# Patient Record
Sex: MALE | Race: OTHER | NOT HISPANIC OR LATINO | URBAN - METROPOLITAN AREA
[De-identification: names, ages, dates, MRNs, and addresses within clinical notes are randomized per-mention and may not be internally consistent; named-entity substitution may affect disease eponyms.]

---

## 2017-01-25 ENCOUNTER — EMERGENCY (EMERGENCY)
Facility: HOSPITAL | Age: 52
LOS: 1 days | Discharge: HOME | End: 2017-01-25
Attending: EMERGENCY MEDICINE | Admitting: EMERGENCY MEDICINE
Payer: COMMERCIAL

## 2017-01-25 VITALS
OXYGEN SATURATION: 97 % | SYSTOLIC BLOOD PRESSURE: 135 MMHG | RESPIRATION RATE: 19 BRPM | WEIGHT: 190.04 LBS | TEMPERATURE: 99 F | HEIGHT: 67 IN | HEART RATE: 77 BPM | DIASTOLIC BLOOD PRESSURE: 90 MMHG

## 2017-01-25 VITALS
TEMPERATURE: 98 F | SYSTOLIC BLOOD PRESSURE: 130 MMHG | HEART RATE: 72 BPM | RESPIRATION RATE: 16 BRPM | DIASTOLIC BLOOD PRESSURE: 87 MMHG | OXYGEN SATURATION: 100 %

## 2017-01-25 DIAGNOSIS — R63.0 ANOREXIA: ICD-10-CM

## 2017-01-25 DIAGNOSIS — R10.31 RIGHT LOWER QUADRANT PAIN: ICD-10-CM

## 2017-01-25 PROBLEM — Z00.00 ENCOUNTER FOR PREVENTIVE HEALTH EXAMINATION: Status: ACTIVE | Noted: 2017-01-25

## 2017-01-25 LAB
ALBUMIN SERPL ELPH-MCNC: 4.5 G/DL — SIGNIFICANT CHANGE UP (ref 3.3–5)
ALP SERPL-CCNC: 52 U/L — SIGNIFICANT CHANGE UP (ref 40–120)
ALT FLD-CCNC: 32 U/L RC — SIGNIFICANT CHANGE UP (ref 10–45)
ANION GAP SERPL CALC-SCNC: 15 MMOL/L — SIGNIFICANT CHANGE UP (ref 5–17)
APPEARANCE UR: CLEAR — SIGNIFICANT CHANGE UP
AST SERPL-CCNC: 24 U/L — SIGNIFICANT CHANGE UP (ref 10–40)
BASOPHILS # BLD AUTO: 0 K/UL — SIGNIFICANT CHANGE UP (ref 0–0.2)
BASOPHILS NFR BLD AUTO: 0.7 % — SIGNIFICANT CHANGE UP (ref 0–2)
BILIRUB SERPL-MCNC: 0.6 MG/DL — SIGNIFICANT CHANGE UP (ref 0.2–1.2)
BILIRUB UR-MCNC: NEGATIVE — SIGNIFICANT CHANGE UP
BUN SERPL-MCNC: 13 MG/DL — SIGNIFICANT CHANGE UP (ref 7–23)
CALCIUM SERPL-MCNC: 9.3 MG/DL — SIGNIFICANT CHANGE UP (ref 8.4–10.5)
CHLORIDE SERPL-SCNC: 102 MMOL/L — SIGNIFICANT CHANGE UP (ref 96–108)
CO2 SERPL-SCNC: 25 MMOL/L — SIGNIFICANT CHANGE UP (ref 22–31)
COLOR SPEC: SIGNIFICANT CHANGE UP
CREAT SERPL-MCNC: 0.84 MG/DL — SIGNIFICANT CHANGE UP (ref 0.5–1.3)
DIFF PNL FLD: NEGATIVE — SIGNIFICANT CHANGE UP
EOSINOPHIL # BLD AUTO: 0.1 K/UL — SIGNIFICANT CHANGE UP (ref 0–0.5)
EOSINOPHIL NFR BLD AUTO: 2.1 % — SIGNIFICANT CHANGE UP (ref 0–6)
GLUCOSE SERPL-MCNC: 99 MG/DL — SIGNIFICANT CHANGE UP (ref 70–99)
GLUCOSE UR QL: NEGATIVE — SIGNIFICANT CHANGE UP
HCT VFR BLD CALC: 40.7 % — SIGNIFICANT CHANGE UP (ref 39–50)
HGB BLD-MCNC: 14.2 G/DL — SIGNIFICANT CHANGE UP (ref 13–17)
KETONES UR-MCNC: NEGATIVE — SIGNIFICANT CHANGE UP
LEUKOCYTE ESTERASE UR-ACNC: NEGATIVE — SIGNIFICANT CHANGE UP
LYMPHOCYTES # BLD AUTO: 2.3 K/UL — SIGNIFICANT CHANGE UP (ref 1–3.3)
LYMPHOCYTES # BLD AUTO: 42.6 % — SIGNIFICANT CHANGE UP (ref 13–44)
MCHC RBC-ENTMCNC: 32.7 PG — SIGNIFICANT CHANGE UP (ref 27–34)
MCHC RBC-ENTMCNC: 34.8 GM/DL — SIGNIFICANT CHANGE UP (ref 32–36)
MCV RBC AUTO: 94 FL — SIGNIFICANT CHANGE UP (ref 80–100)
MONOCYTES # BLD AUTO: 0.4 K/UL — SIGNIFICANT CHANGE UP (ref 0–0.9)
MONOCYTES NFR BLD AUTO: 7.9 % — SIGNIFICANT CHANGE UP (ref 2–14)
NEUTROPHILS # BLD AUTO: 2.6 K/UL — SIGNIFICANT CHANGE UP (ref 1.8–7.4)
NEUTROPHILS NFR BLD AUTO: 46.8 % — SIGNIFICANT CHANGE UP (ref 43–77)
NITRITE UR-MCNC: NEGATIVE — SIGNIFICANT CHANGE UP
PH UR: 6.5 — SIGNIFICANT CHANGE UP (ref 4.8–8)
PLATELET # BLD AUTO: 208 K/UL — SIGNIFICANT CHANGE UP (ref 150–400)
POTASSIUM SERPL-MCNC: 3.6 MMOL/L — SIGNIFICANT CHANGE UP (ref 3.5–5.3)
POTASSIUM SERPL-SCNC: 3.6 MMOL/L — SIGNIFICANT CHANGE UP (ref 3.5–5.3)
PROT SERPL-MCNC: 7.1 G/DL — SIGNIFICANT CHANGE UP (ref 6–8.3)
PROT UR-MCNC: NEGATIVE — SIGNIFICANT CHANGE UP
RBC # BLD: 4.33 M/UL — SIGNIFICANT CHANGE UP (ref 4.2–5.8)
RBC # FLD: 13.2 % — SIGNIFICANT CHANGE UP (ref 10.3–14.5)
SODIUM SERPL-SCNC: 142 MMOL/L — SIGNIFICANT CHANGE UP (ref 135–145)
SP GR SPEC: 1.01 — SIGNIFICANT CHANGE UP (ref 1.01–1.02)
UROBILINOGEN FLD QL: NEGATIVE — SIGNIFICANT CHANGE UP
WBC # BLD: 5.5 K/UL — SIGNIFICANT CHANGE UP (ref 3.8–10.5)
WBC # FLD AUTO: 5.5 K/UL — SIGNIFICANT CHANGE UP (ref 3.8–10.5)

## 2017-01-25 PROCEDURE — 81003 URINALYSIS AUTO W/O SCOPE: CPT

## 2017-01-25 PROCEDURE — 99284 EMERGENCY DEPT VISIT MOD MDM: CPT | Mod: 25

## 2017-01-25 PROCEDURE — 74176 CT ABD & PELVIS W/O CONTRAST: CPT

## 2017-01-25 PROCEDURE — 99285 EMERGENCY DEPT VISIT HI MDM: CPT

## 2017-01-25 PROCEDURE — 74176 CT ABD & PELVIS W/O CONTRAST: CPT | Mod: 26

## 2017-01-25 PROCEDURE — 80053 COMPREHEN METABOLIC PANEL: CPT

## 2017-01-25 PROCEDURE — 85027 COMPLETE CBC AUTOMATED: CPT

## 2017-01-25 RX ORDER — SODIUM CHLORIDE 9 MG/ML
1000 INJECTION INTRAMUSCULAR; INTRAVENOUS; SUBCUTANEOUS ONCE
Qty: 0 | Refills: 0 | Status: COMPLETED | OUTPATIENT
Start: 2017-01-25 | End: 2017-01-25

## 2017-01-25 RX ORDER — SODIUM CHLORIDE 9 MG/ML
3 INJECTION INTRAMUSCULAR; INTRAVENOUS; SUBCUTANEOUS ONCE
Qty: 0 | Refills: 0 | Status: COMPLETED | OUTPATIENT
Start: 2017-01-25 | End: 2017-01-25

## 2017-01-25 RX ORDER — ACETAMINOPHEN 500 MG
975 TABLET ORAL ONCE
Qty: 0 | Refills: 0 | Status: COMPLETED | OUTPATIENT
Start: 2017-01-25 | End: 2017-01-25

## 2017-01-25 RX ADMIN — Medication 975 MILLIGRAM(S): at 19:21

## 2017-01-25 RX ADMIN — Medication 975 MILLIGRAM(S): at 17:55

## 2017-01-25 RX ADMIN — SODIUM CHLORIDE 3 MILLILITER(S): 9 INJECTION INTRAMUSCULAR; INTRAVENOUS; SUBCUTANEOUS at 17:55

## 2017-01-25 RX ADMIN — SODIUM CHLORIDE 1000 MILLILITER(S): 9 INJECTION INTRAMUSCULAR; INTRAVENOUS; SUBCUTANEOUS at 17:55

## 2017-01-25 NOTE — ED PROVIDER NOTE - MEDICAL DECISION MAKING DETAILS
Pt with RLQ abd pain. Diff dx includes appendicitis, R sided diverticulitis, ureteral stone, MSK pain, UTI/pyelo.   Some concerning sx for appy such as anorexia, location of pain, but exam not very impressive.     Will get labs, UA, give pain meds, IVF, urinalysis, obtain CT A/P+. If negative, cause of pain likely not life-threatening.

## 2017-01-25 NOTE — ED PROVIDER NOTE - PROGRESS NOTE DETAILS
Pt made aware of incidental questionable bladder lesion and need to follow up as well as punctuate nephrolithiasis.

## 2017-01-25 NOTE — ED PROVIDER NOTE - RADIOLOGY FINDINGS
no appy. Pt informed of incidental findings such as bladder lesion., will f/u with PMD./reviewed by me

## 2017-01-25 NOTE — ED PROVIDER NOTE - OBJECTIVE STATEMENT
51yoM with no sig pmhx presents with 1 day RLQ abd pain, constant, achy. Went to PCP who sent him to ED to r/o appy. Denies vomiting or fever. +anorexia. States had BRB in toilet bowl 3 days ago but not since. Otherwise normal BMs. Pain aggravated with walking. Has not taken pain meds. Had dysuria last week and PCP checked urine, states was normal. Now no longer with dysuria. No testicular pain.

## 2017-01-25 NOTE — ED ADULT NURSE REASSESSMENT NOTE - NS ED NURSE REASSESS COMMENT FT1
Pt AAOx4, NAD, sitting up comfortably in bed with family at bedside. Pt reports improvement in RLQ pain to 6/10. Pt denies headache, dizziness, chest pain, cough, SOB, n/v/d, urinary symptoms, fevers, chills, weakness at this time. Safety maintained. Pt awaiting CT results.

## 2017-01-25 NOTE — ED ADULT NURSE NOTE - OBJECTIVE STATEMENT
52yo m a&ox4 ambulated into ED from pmd office to r/o appy. pt states RLQ pain began yesterday, denies fevers/chills, denies n/v/d, denies pain upon urination, +tender to RLQ upon palpation. pt denies recent abd sx.

## 2017-04-06 ENCOUNTER — APPOINTMENT (OUTPATIENT)
Dept: GASTROENTEROLOGY | Facility: CLINIC | Age: 52
End: 2017-04-06

## 2021-09-15 NOTE — ED ADULT TRIAGE NOTE - PRO INTERPRETER NEED 2
[Abdomen Masses] : No abdominal masses [Abdomen Tenderness] : ~T No ~M abdominal tenderness [JVD] : no jugular venous distention  [Respiratory Effort] : normal respiratory effort [Normal Rate and Rhythm] : normal rate and rhythm [No Rash or Lesion] : No rash or lesion [Alert] : alert [Calm] : calm [de-identified] : Umbilical hernia present, reducible, approximately 1 cm defect. Mild diastasis present as well, no other ventral hernia defects appreciated.  [de-identified] : No acute distress English

## 2024-09-25 ENCOUNTER — INPATIENT (INPATIENT)
Facility: HOSPITAL | Age: 59
LOS: 6 days | Discharge: HOME | DRG: 66 | End: 2024-10-02
Attending: NEUROLOGICAL SURGERY | Admitting: NEUROLOGICAL SURGERY
Payer: COMMERCIAL

## 2024-09-25 ENCOUNTER — EMERGENCY (EMERGENCY)
Facility: HOSPITAL | Age: 59
LOS: 1 days | Discharge: ANOTHER HOSPITAL | End: 2024-09-25
Attending: STUDENT IN AN ORGANIZED HEALTH CARE EDUCATION/TRAINING PROGRAM | Admitting: STUDENT IN AN ORGANIZED HEALTH CARE EDUCATION/TRAINING PROGRAM
Payer: COMMERCIAL

## 2024-09-25 VITALS
TEMPERATURE: 98 F | RESPIRATION RATE: 17 BRPM | HEART RATE: 73 BPM | SYSTOLIC BLOOD PRESSURE: 135 MMHG | DIASTOLIC BLOOD PRESSURE: 79 MMHG | OXYGEN SATURATION: 98 %

## 2024-09-25 VITALS
TEMPERATURE: 98 F | SYSTOLIC BLOOD PRESSURE: 136 MMHG | RESPIRATION RATE: 16 BRPM | DIASTOLIC BLOOD PRESSURE: 83 MMHG | OXYGEN SATURATION: 96 % | WEIGHT: 160.06 LBS | HEIGHT: 68 IN | HEART RATE: 72 BPM

## 2024-09-25 VITALS
SYSTOLIC BLOOD PRESSURE: 132 MMHG | HEIGHT: 68 IN | OXYGEN SATURATION: 99 % | WEIGHT: 199.96 LBS | RESPIRATION RATE: 14 BRPM | DIASTOLIC BLOOD PRESSURE: 81 MMHG | HEART RATE: 62 BPM | TEMPERATURE: 98 F

## 2024-09-25 DIAGNOSIS — I62.00 NONTRAUMATIC SUBDURAL HEMORRHAGE, UNSPECIFIED: ICD-10-CM

## 2024-09-25 LAB
ALBUMIN SERPL ELPH-MCNC: 4.2 G/DL — SIGNIFICANT CHANGE UP (ref 3.3–5)
ALP SERPL-CCNC: 56 U/L — SIGNIFICANT CHANGE UP (ref 30–120)
ALT FLD-CCNC: 30 U/L — SIGNIFICANT CHANGE UP (ref 10–60)
ANION GAP SERPL CALC-SCNC: 8 MMOL/L — SIGNIFICANT CHANGE UP (ref 5–17)
AST SERPL-CCNC: 18 U/L — SIGNIFICANT CHANGE UP (ref 10–40)
BASOPHILS # BLD AUTO: 0.01 K/UL — SIGNIFICANT CHANGE UP (ref 0–0.2)
BASOPHILS NFR BLD AUTO: 0.1 % — SIGNIFICANT CHANGE UP (ref 0–2)
BILIRUB SERPL-MCNC: 0.9 MG/DL — SIGNIFICANT CHANGE UP (ref 0.2–1.2)
BLD GP AB SCN SERPL QL: SIGNIFICANT CHANGE UP
BUN SERPL-MCNC: 15 MG/DL — SIGNIFICANT CHANGE UP (ref 7–23)
CALCIUM SERPL-MCNC: 9.6 MG/DL — SIGNIFICANT CHANGE UP (ref 8.4–10.5)
CHLORIDE SERPL-SCNC: 100 MMOL/L — SIGNIFICANT CHANGE UP (ref 96–108)
CO2 SERPL-SCNC: 28 MMOL/L — SIGNIFICANT CHANGE UP (ref 22–31)
CREAT SERPL-MCNC: 0.96 MG/DL — SIGNIFICANT CHANGE UP (ref 0.5–1.3)
EGFR: 90 ML/MIN/1.73M2 — SIGNIFICANT CHANGE UP
EGFR: 90 ML/MIN/1.73M2 — SIGNIFICANT CHANGE UP
EOSINOPHIL # BLD AUTO: 0.02 K/UL — SIGNIFICANT CHANGE UP (ref 0–0.5)
EOSINOPHIL NFR BLD AUTO: 0.3 % — SIGNIFICANT CHANGE UP (ref 0–6)
GLUCOSE BLDC GLUCOMTR-MCNC: 106 MG/DL — HIGH (ref 70–99)
GLUCOSE SERPL-MCNC: 130 MG/DL — HIGH (ref 70–99)
HCT VFR BLD CALC: 41.8 % — SIGNIFICANT CHANGE UP (ref 39–50)
HGB BLD-MCNC: 13.9 G/DL — SIGNIFICANT CHANGE UP (ref 13–17)
IMM GRANULOCYTES NFR BLD AUTO: 0.3 % — SIGNIFICANT CHANGE UP (ref 0–0.9)
LYMPHOCYTES # BLD AUTO: 1.07 K/UL — SIGNIFICANT CHANGE UP (ref 1–3.3)
LYMPHOCYTES # BLD AUTO: 15.6 % — SIGNIFICANT CHANGE UP (ref 13–44)
MAGNESIUM SERPL-MCNC: 1.9 MG/DL — SIGNIFICANT CHANGE UP (ref 1.6–2.6)
MCHC RBC-ENTMCNC: 30.4 PG — SIGNIFICANT CHANGE UP (ref 27–34)
MCHC RBC-ENTMCNC: 33.3 G/DL — SIGNIFICANT CHANGE UP (ref 32–36)
MCV RBC AUTO: 91.5 FL — SIGNIFICANT CHANGE UP (ref 80–100)
MONOCYTES # BLD AUTO: 0.28 K/UL — SIGNIFICANT CHANGE UP (ref 0–0.9)
MONOCYTES NFR BLD AUTO: 4.1 % — SIGNIFICANT CHANGE UP (ref 2–14)
MRSA PCR RESULT.: SIGNIFICANT CHANGE UP
NEUTROPHILS # BLD AUTO: 5.47 K/UL — SIGNIFICANT CHANGE UP (ref 1.8–7.4)
NEUTROPHILS NFR BLD AUTO: 79.6 % — HIGH (ref 43–77)
NRBC # BLD: 0 /100 WBCS — SIGNIFICANT CHANGE UP (ref 0–0)
NRBC BLD-RTO: 0 /100 WBCS — SIGNIFICANT CHANGE UP (ref 0–0)
PLATELET # BLD AUTO: 188 K/UL — SIGNIFICANT CHANGE UP (ref 150–400)
PLATELET RESPONSE ASPIRIN RESULT: 447 ARU — SIGNIFICANT CHANGE UP
POTASSIUM SERPL-MCNC: 3.3 MMOL/L — LOW (ref 3.5–5.3)
POTASSIUM SERPL-SCNC: 3.3 MMOL/L — LOW (ref 3.5–5.3)
PROT SERPL-MCNC: 7.5 G/DL — SIGNIFICANT CHANGE UP (ref 6–8.3)
RBC # BLD: 4.57 M/UL — SIGNIFICANT CHANGE UP (ref 4.2–5.8)
RBC # FLD: 13.6 % — SIGNIFICANT CHANGE UP (ref 10.3–14.5)
S AUREUS DNA NOSE QL NAA+PROBE: SIGNIFICANT CHANGE UP
SODIUM SERPL-SCNC: 136 MMOL/L — SIGNIFICANT CHANGE UP (ref 135–145)
WBC # BLD: 6.87 K/UL — SIGNIFICANT CHANGE UP (ref 3.8–10.5)
WBC # FLD AUTO: 6.87 K/UL — SIGNIFICANT CHANGE UP (ref 3.8–10.5)

## 2024-09-25 PROCEDURE — 99285 EMERGENCY DEPT VISIT HI MDM: CPT | Mod: 25

## 2024-09-25 PROCEDURE — 85025 COMPLETE CBC W/AUTO DIFF WBC: CPT

## 2024-09-25 PROCEDURE — 36415 COLL VENOUS BLD VENIPUNCTURE: CPT

## 2024-09-25 PROCEDURE — 70496 CT ANGIOGRAPHY HEAD: CPT | Mod: 26,MC

## 2024-09-25 PROCEDURE — 70553 MRI BRAIN STEM W/O & W/DYE: CPT | Mod: 26

## 2024-09-25 PROCEDURE — 99291 CRITICAL CARE FIRST HOUR: CPT

## 2024-09-25 PROCEDURE — 96374 THER/PROPH/DIAG INJ IV PUSH: CPT | Mod: XU

## 2024-09-25 PROCEDURE — 83735 ASSAY OF MAGNESIUM: CPT

## 2024-09-25 PROCEDURE — 99285 EMERGENCY DEPT VISIT HI MDM: CPT

## 2024-09-25 PROCEDURE — 96375 TX/PRO/DX INJ NEW DRUG ADDON: CPT | Mod: XU

## 2024-09-25 PROCEDURE — 72125 CT NECK SPINE W/O DYE: CPT | Mod: MC

## 2024-09-25 PROCEDURE — 72125 CT NECK SPINE W/O DYE: CPT | Mod: 26,MC

## 2024-09-25 PROCEDURE — 99223 1ST HOSP IP/OBS HIGH 75: CPT

## 2024-09-25 PROCEDURE — 70498 CT ANGIOGRAPHY NECK: CPT | Mod: 26,MC

## 2024-09-25 PROCEDURE — 70450 CT HEAD/BRAIN W/O DYE: CPT | Mod: MC

## 2024-09-25 PROCEDURE — 80053 COMPREHEN METABOLIC PANEL: CPT

## 2024-09-25 PROCEDURE — 70496 CT ANGIOGRAPHY HEAD: CPT | Mod: MC

## 2024-09-25 PROCEDURE — 70498 CT ANGIOGRAPHY NECK: CPT | Mod: MC

## 2024-09-25 RX ORDER — ACETAMINOPHEN 325 MG
1000 TABLET ORAL ONCE
Refills: 0 | Status: COMPLETED | OUTPATIENT
Start: 2024-09-25 | End: 2024-09-25

## 2024-09-25 RX ORDER — DIPHENHYDRAMINE HCL 12.5MG/5ML
25 ELIXIR ORAL ONCE
Refills: 0 | Status: COMPLETED | OUTPATIENT
Start: 2024-09-25 | End: 2024-09-25

## 2024-09-25 RX ORDER — SENNOSIDES 8.6 MG
2 TABLET ORAL AT BEDTIME
Refills: 0 | Status: DISCONTINUED | OUTPATIENT
Start: 2024-09-25 | End: 2024-10-02

## 2024-09-25 RX ORDER — ONDANSETRON HCL/PF 4 MG/2 ML
4 VIAL (ML) INJECTION EVERY 6 HOURS
Refills: 0 | Status: DISCONTINUED | OUTPATIENT
Start: 2024-09-25 | End: 2024-10-02

## 2024-09-25 RX ORDER — LEVETIRACETAM 1000 MG
500 TABLET ORAL EVERY 12 HOURS
Refills: 0 | Status: DISCONTINUED | OUTPATIENT
Start: 2024-09-25 | End: 2024-09-26

## 2024-09-25 RX ORDER — SODIUM CHLORIDE 0.9 % (FLUSH) 0.9 %
1000 SYRINGE (ML) INJECTION
Refills: 0 | Status: DISCONTINUED | OUTPATIENT
Start: 2024-09-25 | End: 2024-09-27

## 2024-09-25 RX ORDER — INFLUENZA VIRUS VACCINE 15; 15; 15; 15 UG/.5ML; UG/.5ML; UG/.5ML; UG/.5ML
0.5 SUSPENSION INTRAMUSCULAR ONCE
Refills: 0 | Status: DISCONTINUED | OUTPATIENT
Start: 2024-09-25 | End: 2024-10-02

## 2024-09-25 RX ORDER — METOCLOPRAMIDE HCL 10 MG
10 TABLET ORAL ONCE
Refills: 0 | Status: COMPLETED | OUTPATIENT
Start: 2024-09-25 | End: 2024-09-25

## 2024-09-25 RX ORDER — ACETAMINOPHEN 325 MG
650 TABLET ORAL ONCE
Refills: 0 | Status: COMPLETED | OUTPATIENT
Start: 2024-09-25 | End: 2024-09-25

## 2024-09-25 RX ORDER — ACETAMINOPHEN 500 MG/5ML
1000 LIQUID (ML) ORAL ONCE
Refills: 0 | Status: COMPLETED | OUTPATIENT
Start: 2024-09-25 | End: 2024-09-25

## 2024-09-25 RX ORDER — CHLORHEXIDINE GLUCONATE ORAL RINSE 1.2 MG/ML
1 SOLUTION DENTAL DAILY
Refills: 0 | Status: DISCONTINUED | OUTPATIENT
Start: 2024-09-25 | End: 2024-10-02

## 2024-09-25 RX ADMIN — Medication 400 MILLIGRAM(S): at 23:12

## 2024-09-25 RX ADMIN — Medication 400 MILLIGRAM(S): at 17:05

## 2024-09-25 RX ADMIN — Medication 25 MILLIGRAM(S): at 13:43

## 2024-09-25 RX ADMIN — Medication 75 MILLILITER(S): at 17:04

## 2024-09-25 RX ADMIN — Medication 650 MILLIGRAM(S): at 21:58

## 2024-09-25 RX ADMIN — Medication 650 MILLIGRAM(S): at 20:58

## 2024-09-25 RX ADMIN — Medication 10 MILLIGRAM(S): at 13:43

## 2024-09-25 RX ADMIN — Medication 500 MILLIGRAM(S): at 18:12

## 2024-09-25 RX ADMIN — Medication 2 TABLET(S): at 20:28

## 2024-09-25 RX ADMIN — Medication 400 MILLIGRAM(S): at 13:43

## 2024-09-25 RX ADMIN — Medication 1000 MILLILITER(S): at 13:43

## 2024-09-25 NOTE — H&P ADULT - NSHPLABSRESULTS_GEN_ALL_CORE
13.9   6.87  )-----------( 188      ( 25 Sep 2024 13:42 )             41.8     09-25    136  |  100  |  15  ----------------------------<  130[H]  3.3[L]   |  28  |  0.96    Ca    9.6      25 Sep 2024 13:42  Mg     1.9     09-25    TPro  7.5  /  Alb  4.2  /  TBili  0.9  /  DBili  x   /  AST  18  /  ALT  30  /  AlkPhos  56  09-25      < from: CT Head No Cont (09.25.24 @ 14:28) >    IMPRESSION:  1. Bilateral holohemispheric subdural hematomas which are multiloculated   and of mixed density, suggesting both chronic, subacute, and acute   components. Both collections measure approximately 1 cm without   significant midline shift. Gyral and sulcal effacement.  2. No intraparenchymal hemorrhage, or infarct.  3. Widely patent head and neck vasculature, as outlined above.  4. No acute cervical fracture identified. Underlying degenerative changes   most extensive at C5-C6. Upper thoracic degenerative changes also noted.    Follow-up MR imaging of the brain recommended for further assessment and   characterization of the bilateral subdural collections.    Physician:  ISMAEL MARIE was notified of the initial findings on the   preliminary interpretation at 2:28 PM.    --- End of Report ---  TOY CAREY MD; Attending Radiologist  This document has been electronically signed. Sep 25 2024  2:40PM    < end of copied text >

## 2024-09-25 NOTE — CONSULT NOTE ADULT - ASSESSMENT
59M with PMH kidney stones, possible TIA on aspirin 81 mg daily who presented with HA x5-6 days, worsening today with N/V. CTH showed b/l SDH. Transferred to [facility] for neurosurgical evaluation.    Plan:  Neuro:  - Q1 hour Neuro checks, Q1 hour Vitals  - HOB 30 degrees, Neck midline position  - Maintain normothermia, PO acetaminophen for temp>38 C or pain  - Neurosurgical Imaging Reviewed  - Repeat imaging: CTH in 6 hours to establish stability  - MRI w/wo 2/2 non-traumatic SDH  - Continue AED: keppra 500 BID   - Pain management & Sedation: PRN tylenol   - Turn and Position Q2  /  Activity ad ronda, with assistance  	  CV:  - SBP Goal   - BP regimen: PRN hydralazine / labetalol     Pulm:  - Supplemental O2 PRN to maintain Spo2>92%  - Chest PT, OOB, Pulmonary Toilet    GI:  - Nutrition: regular diet, NPO @ Mn   - Bowel regimen: senna, miralax   	  Gu:  - Voiding  - Fluids: NS @ 75   - I&O Q1 hour  - Monitor Electrolytes & Renal Function    Heme:  - Monitor H&H  - ARU now  - Hold AC/Ap  - Chemical DVT prophylaxis: * Chemical DVT prophylaxis is contraindicated due to risk of bleeding  - Mechanical DVT Prophylaxis: Maintain B/L LE sequential compression devices  	  ID:  - Monitor WBC and Temperature  		  Endo  - Monitor BGL, maintain <180

## 2024-09-25 NOTE — H&P ADULT - NSHPPHYSICALEXAM_GEN_ALL_CORE
Constitutional: NAD, lying in bed   Neuro  	* Mental Status:  GCS 15: E4, V5, M6. Awake, alert, oriented to conversation. No aphasia or dysarthria. Able to name objects & their function.   	* Cranial Nerves: Cnii-Cnxii grossly intact. PERRL, EOMI, tongue midline, no gaze deviation.   	* Motor: RUE 5/5, LUE 5/5, RLE 5/5, LLE 5/5, no drift or dysmetria  	* Sensory: Sensation intact to light touch  	* Reflexes: Not assessed  	* Gait: Not assessed  Cardiovascular:   Regular rate and rhythm.  Eyes: See neurologic examination with detailed examination of eyes.  ENT: No JVD, Trachea Midline.  Respiratory: non labored breathing.  Gastrointestinal: Soft, nontender, nondistended.  Genitourinary: Aleman [  ]  No Aleman [ x ]   Musculoskeletal: No muscle wasting noted, (See neurologic assessment for full muscle strength assessment)   Skin:  no skin breakdown  Hematologic / Lymph / Immunologic: No bleeding from IV sites or wounds

## 2024-09-25 NOTE — H&P ADULT - NSHPREVIEWOFSYSTEMS_GEN_ALL_CORE
Constitutional: no fevers, chills, or new weakness  Eyes: No double vision, no change in visual acuity, no blurry vision  Neurologic: + HA. No dizziness, weakness, numbness, tingling, difficulty walking, difficulty speaking   Cardiovascular: No palpitations, no chest pain, no nocturnal or positional dyspnea.  Respiratory: No shortness of breath, No Cough  Gastrointestinal: No change in bowel habits, no change in appetite  Genitourinary: No Frequency, No Dysuria, no Hematuria  Musculoskeletal: No muscle wasting, No new weakness  Psych: No changes in mood, Denies drug use, Denies history of psyciatric illness    All other systems reviewed and are negative

## 2024-09-25 NOTE — PROGRESS NOTE ADULT - ASSESSMENT
Assessment:  58 yo M with BL mixed density SDH with mass effect/brain compression.  PMH of TIA, on ASA.    Plan:  frequent neurochecks  stability CTH (or MRI w/wo if done tonight)  cont heber Combs ppx  plan for MMA embo in am  -160, PRN meds  Maintain osats>92%, incentive spirometry  diet as tolerated - NPO MN for am procedure; BM regimen  monitor e-lytes, I/Os  maintain -180, ISS  SCDs, hold antithrombotics as fresh postop

## 2024-09-25 NOTE — ED PROVIDER NOTE - OBJECTIVE STATEMENT
59 year old male with no significant PMH presents as a transfer from [facility] for SDH. Pt reports that he has had 59 year old male with no significant PMH presents as a transfer from [facility] for SDH. Pt reports that he has had 5 days of a constant, b/l frontal throbbing headache. He denies confusion, blurry vision, extremity weakness. Pt went to [place] where he was found to have b/l SDH and transferred here for NSx.

## 2024-09-25 NOTE — H&P ADULT - NS ATTEND AMEND GEN_ALL_CORE FT
NSGY Attg:    see above    patient seen and examined    agree with exam as above    GCS 15  A and O x 3  5/5  no drift    imaging reviewed -- bilateral subacute SDH    plan of care determined for bilateral SDH  CTA negative  MRI pending  repeat CT 6 hours  hold ASA; check ASA response test  possible bilateral MMA embo per neuro IR

## 2024-09-25 NOTE — H&P ADULT - NSVTERISKREFERASSESS_GEN_ALL_CORE
Refer to the Assessment tab to view/cancel completed assessment.
Render In Strict Bullet Format?: No
Detail Level: Simple
Continue Regimen: Jublia 10 % topical solution with applicator\\nApply to toenails once daily

## 2024-09-25 NOTE — CONSULT NOTE ADULT - SUBJECTIVE AND OBJECTIVE BOX
Patient is a 59y old  Male who presents with a chief complaint of b/l SDH (25 Sep 2024 16:53)    HPI:  59M with PMH kidney stones, possible TIA who presents today for b/l SDH. Per patient, he began having headaches about 5-6 days ago, came on gradually and now 8/10. He was taking intermittent tylenol and ibuprofen for pain which did help for some of the pain. He reports that the headache worsened today and was associated with one episode of nausea and vomiting. Due to symptoms, he presented to [facility] for workup. He underwent CTH which showed b/l chronic on subacute on acute SDH, 1cm each with no MLS. He was transferred to [facility] for neurosurgical evaluation. He admits to taking baby aspirin daily due to possible TIA, last took this am. He denies weakness, numbness, tingling, dizziness, difficulty walking.  (25 Sep 2024 16:53)      PAST MEDICAL & SURGICAL HISTORY:  Brain TIA  Kidney stones  No significant past surgical history      SOCIAL HISTORY:  Tobacco Use: former smoker quit 20 years ago  EtOH use: occasional 1-2 drinks on weekend  Substance: denies       Allergies  No Known Allergies      REVIEW OF SYSTEMS  Negative except as noted in HPI  CONSTITUTIONAL: No fever, weight loss, or fatigue  EYES: No eye pain, visual disturbances, or discharge  ENMT:  No difficulty hearing, tinnitus, vertigo; No sinus or throat pain  NECK: No pain or stiffness  RESPIRATORY: No cough, wheezing, chills or hemoptysis; No shortness of breath  CARDIOVASCULAR: No chest pain, palpitations, dizziness, or leg swelling  GASTROINTESTINAL: No abdominal or epigastric pain. No nausea, vomiting, or hematemesis; No diarrhea or constipation. No melena or hematochezia.  GENITOURINARY: No dysuria, frequency, hematuria, or incontinence  NEUROLOGICAL: No headaches, memory loss, loss of strength, numbness, or tremors  MUSCULOSKELETAL: No joint pain or swelling; No muscle, back, or extremity pain      HOME MEDICATIONS:  Home Medications:  aspirin 81 mg oral capsule: 1 cap(s) orally once a day (25 Sep 2024 16:58)      MEDICATIONS:  Antibiotics:    Neuro:  acetaminophen     Tablet .. 650 milliGRAM(s) Oral once PRN  levETIRAcetam   Injectable 500 milliGRAM(s) IV Push every 12 hours  ondansetron Injectable 4 milliGRAM(s) IV Push every 6 hours PRN    Anticoagulation:    OTHER:  polyethylene glycol 3350 17 Gram(s) Oral daily  senna 2 Tablet(s) Oral at bedtime    IVF:  sodium chloride 0.9%. 1000 milliLiter(s) IV Continuous <Continuous>      Vital Signs Last 24 Hrs  T(C): 36.8 (25 Sep 2024 15:49), Max: 36.8 (25 Sep 2024 15:15)  T(F): 98.3 (25 Sep 2024 15:49), Max: 98.3 (25 Sep 2024 15:15)  HR: 72 (25 Sep 2024 15:49) (62 - 73)  BP: 136/83 (25 Sep 2024 15:49) (132/81 - 136/83)  BP(mean): --  RR: 16 (25 Sep 2024 15:49) (14 - 17)  SpO2: 96% (25 Sep 2024 15:49) (96% - 99%)    Parameters below as of 25 Sep 2024 15:49  Patient On (Oxygen Delivery Method): room air      Physical Exam:  Constitutional: NAD, lying in bed  Neuro  * Mental Status:  GCS 15: Awake, alert, oriented to conversation. No aphasia or difficulty speaking. No dysarthria.  * Cranial Nerves: Cnii-Cnxii grossly intact. PERRL, EOMI, tongue midline, no gaze deviation  * Motor: RUE 5/5, LUE 5/5, RLE 5/5, LLE 5/5, no drift or dysmetria  * Sensory: Sensation intact to light touch  * Reflexes: not assessed   Cardiovascular: Regular rate and rhythm.  Eyes: See neurologic examination with detailed examination of eyes.  ENT: No JVD, Trachea Midline  Respiratory: non labored breathing   Gastrointestinal: Soft, nontender, nondistended.  Genitourinary: [ ] Aleman, [ x ] No Aleman.   Musculoskeletal: No muscle wasting noted, (See neurologic assessment for full muscle strength assessment)   Skin:  no wounds, no redness, no abrasions noted  Hematologic / Lymph / Immunologic: No bleeding from IV sites or wounds      LABS:                        13.9   6.87  )-----------( 188      ( 25 Sep 2024 13:42 )             41.8     09-25    136  |  100  |  15  ----------------------------<  130[H]  3.3[L]   |  28  |  0.96    Ca    9.6      25 Sep 2024 13:42  Mg     1.9     09-25    TPro  7.5  /  Alb  4.2  /  TBili  0.9  /  DBili  x   /  AST  18  /  ALT  30  /  AlkPhos  56  09-25      RADIOLOGY & ADDITIONAL STUDIES:  < from: CT Head No Cont (09.25.24 @ 14:28) >  IMPRESSION:    1. Bilateral holohemispheric subdural hematomas which are multiloculated   and of mixed density, suggesting both chronic, subacute, and acute   components. Both collections measure approximately 1 cm without   significant midline shift. Gyral and sulcal effacement.  2. No intraparenchymal hemorrhage, or infarct.  3. Widely patent head and neck vasculature, as outlined above.  4. No acute cervical fracture identified. Underlying degenerative changes   most extensive at C5-C6. Upper thoracic degenerative changes also noted.    Follow-up MR imaging of the brain recommended for further assessment and   characterization of the bilateral subdural collections.    Physician:  ISMAEL MARIE was notified of the initial findings on the   preliminary interpretation at 2:28 PM.    --- End of Report ---  TOY CAREY MD; Attending Radiologist  This document has been electronically signed. Sep 25 2024  2:40PM    < end of copied text >

## 2024-09-25 NOTE — PROGRESS NOTE ADULT - SUBJECTIVE AND OBJECTIVE BOX
NIGHTTIME ROUNDS    Recent events: admitted to NSICU.    Available labs, vitals, imaging reviewed.    Exam:  AO x3, FC, EO spont, face symmetric, tongue midline, EOMI, PERRLA, motor - JENKINS  Sensation intact to LT.  CTAB  S1S2 present  Abd soft, NT, ND  No peripheral edema   done

## 2024-09-25 NOTE — ED ADULT NURSE NOTE - NSFALLUNIVINTERV_ED_ALL_ED
Bed/Stretcher in lowest position, wheels locked, appropriate side rails in place/Call bell, personal items and telephone in reach/Instruct patient to call for assistance before getting out of bed/chair/stretcher/Non-slip footwear applied when patient is off stretcher/[place] to call system/Physically safe environment - no spills, clutter or unnecessary equipment/Purposeful proactive rounding/Room/bathroom lighting operational, light cord in reach

## 2024-09-25 NOTE — ED ADULT NURSE NOTE - OBJECTIVE STATEMENT
Pt presents to the ED A&Ox4 transferred from [place] for NSG consult. Pt c/o headache. Pt denies N/V at this time. Pt denies vision changes. Pt RR even and unlabored, NAD noted. Pt denies chest/abdominal pain and SOB.

## 2024-09-25 NOTE — H&P ADULT - HISTORY OF PRESENT ILLNESS
59M with PMH kidney stones, possible TIA who presents today for b/l SDH. Per patient, he began having headaches about 5-6 days ago, came on gradually and now 8/10. He was taking intermittent tylenol and ibuprofen for pain which did help for some of the pain. He reports that the headache worsened today and was associated with one episode of nausea and vomiting. Due to symptoms, he presented to [facility] for workup. He underwent CTH which showed b/l chronic on subacute on acute SDH, 1cm each with no MLS. He was transferred to [facility] for neurosurgical evaluation. He admits to taking baby aspirin daily due to possible TIA, last took this am. He denies weakness, numbness, tingling, dizziness, difficulty walking.

## 2024-09-25 NOTE — CONSULT NOTE ADULT - SUBJECTIVE AND OBJECTIVE BOX
HPI:      SUBJECTIVE: No events overnight.  No new neurologic complaints.      acetaminophen     Tablet .. 650 milliGRAM(s) Oral once PRN  acetaminophen   IVPB .. 1000 milliGRAM(s) IV Intermittent once  levETIRAcetam   Injectable 500 milliGRAM(s) IV Push every 12 hours  ondansetron Injectable 4 milliGRAM(s) IV Push every 6 hours PRN  polyethylene glycol 3350 17 Gram(s) Oral daily  senna 2 Tablet(s) Oral at bedtime  sodium chloride 0.9%. 1000 milliLiter(s) IV Continuous <Continuous>      PHYSICAL EXAM:   Vital Signs Last 24 Hrs  T(C): 36.8 (25 Sep 2024 15:49), Max: 36.8 (25 Sep 2024 15:15)  T(F): 98.3 (25 Sep 2024 15:49), Max: 98.3 (25 Sep 2024 15:15)  HR: 72 (25 Sep 2024 15:49) (62 - 73)  BP: 136/83 (25 Sep 2024 15:49) (132/81 - 136/83)  BP(mean): --  RR: 16 (25 Sep 2024 15:49) (14 - 17)  SpO2: 96% (25 Sep 2024 15:49) (96% - 99%)    Parameters below as of 25 Sep 2024 15:49  Patient On (Oxygen Delivery Method): room air        General: No acute distress  HEENT: EOM intact, visual fields full  Abdomen: Soft, nontender, nondistended   Extremities: No edema    NEUROLOGICAL EXAM:  Mental status: Awake, alert, oriented x3, no aphasia, no neglect, normal memory   Cranial Nerves: No facial asymmetry, no nystagmus, no dysarthria, no dysphagia, tongue midline, shoulder shrug intact bilaterally.  Motor exam: Normal tone, no drift, 5/5 RUE, 5/5 RLE, 5/5 LUE, 5/5 LLE, normal fine finger movements.  Sensation: Intact to light touch   Coordination/ Gait: No dysmetria, YADI intact and symmetric bilaterally, on bedrest     LABS:                        13.9   6.87  )-----------( 188      ( 25 Sep 2024 13:42 )             41.8    09-25    136  |  100  |  15  ----------------------------<  130[H]  3.3[L]   |  28  |  0.96    Ca    9.6      25 Sep 2024 13:42  Mg     1.9     09-25    TPro  7.5  /  Alb  4.2  /  TBili  0.9  /  DBili  x   /  AST  18  /  ALT  30  /  AlkPhos  56  09-25        IMAGING: Reviewed by me.          This is a 59 year old gentleman with a history of kidney stones and TIA (on ASA 81 mg QD) who presented with a worsening headache for the past five days. Today he had an two episodes of vomiting in row, which prompted ER visi to [facility]. Patient denies any falls, injury to the head or neck. He denies any other neurological complains. His last dose of ASA was this AM. CTH demonstrated b/l chronic SDH with some acute component; around 1 cm in size b/l; there was no MLS. CTH h/n has not demonstrated         SUBJECTIVE: Patient endorses the bifrontal HA 8/10.      acetaminophen     Tablet .. 650 milliGRAM(s) Oral once PRN  acetaminophen   IVPB .. 1000 milliGRAM(s) IV Intermittent once  levETIRAcetam   Injectable 500 milliGRAM(s) IV Push every 12 hours  ondansetron Injectable 4 milliGRAM(s) IV Push every 6 hours PRN  polyethylene glycol 3350 17 Gram(s) Oral daily  senna 2 Tablet(s) Oral at bedtime  sodium chloride 0.9%. 1000 milliLiter(s) IV Continuous <Continuous>      PHYSICAL EXAM:   Vital Signs Last 24 Hrs  T(C): 36.8 (25 Sep 2024 15:49), Max: 36.8 (25 Sep 2024 15:15)  T(F): 98.3 (25 Sep 2024 15:49), Max: 98.3 (25 Sep 2024 15:15)  HR: 72 (25 Sep 2024 15:49) (62 - 73)  BP: 136/83 (25 Sep 2024 15:49) (132/81 - 136/83)  BP(mean): --  RR: 16 (25 Sep 2024 15:49) (14 - 17)  SpO2: 96% (25 Sep 2024 15:49) (96% - 99%)    Parameters below as of 25 Sep 2024 15:49  Patient On (Oxygen Delivery Method): room air        General: No acute distress  HEENT: EOM intact, visual fields full  Abdomen: Soft, nontender, nondistended   Extremities: No edema    NEUROLOGICAL EXAM:  Mental status: Awake, alert, oriented x3, no aphasia, no neglect, normal memory   Cranial Nerves: No facial asymmetry, no nystagmus, no dysarthria, no dysphagia, tongue midline, shoulder shrug intact bilaterally.  Motor exam: Normal tone, no drift, 5/5 RUE, 5/5 RLE, 5/5 LUE, 5/5 LLE, normal fine finger movements.  Sensation: Intact to light touch   Coordination/ Gait: No dysmetria, YADI intact and symmetric bilaterally, on bedrest     LABS:                        13.9   6.87  )-----------( 188      ( 25 Sep 2024 13:42 )             41.8    09-25    136  |  100  |  15  ----------------------------<  130[H]  3.3[L]   |  28  |  0.96    Ca    9.6      25 Sep 2024 13:42  Mg     1.9     09-25    TPro  7.5  /  Alb  4.2  /  TBili  0.9  /  DBili  x   /  AST  18  /  ALT  30  /  AlkPhos  56  09-25        IMAGING: Reviewed by me.      NeuroIR Consult.    This is a 59 year old gentleman with a history of kidney stones and TIA (on ASA 81 mg QD) who presented with a worsening headache for the past five days. Today he had two episodes of vomiting in row, which prompted ER visit to [facility]. Patient denies any falls, injury to the head or neck. He denies any other neurological complains. His last dose of ASA was this AM. CTH demonstrated b/l chronic SDH with some acute component; around 1 cm in size b/l; there was no MLS. CTH h/n has not demonstrated acute findings or vessel abnormalities.         SUBJECTIVE: Patient endorses the bifrontal HA 8/10.      acetaminophen     Tablet .. 650 milliGRAM(s) Oral once PRN  acetaminophen   IVPB .. 1000 milliGRAM(s) IV Intermittent once  levETIRAcetam   Injectable 500 milliGRAM(s) IV Push every 12 hours  ondansetron Injectable 4 milliGRAM(s) IV Push every 6 hours PRN  polyethylene glycol 3350 17 Gram(s) Oral daily  senna 2 Tablet(s) Oral at bedtime  sodium chloride 0.9%. 1000 milliLiter(s) IV Continuous <Continuous>      PHYSICAL EXAM:   Vital Signs Last 24 Hrs  T(C): 36.8 (25 Sep 2024 15:49), Max: 36.8 (25 Sep 2024 15:15)  T(F): 98.3 (25 Sep 2024 15:49), Max: 98.3 (25 Sep 2024 15:15)  HR: 72 (25 Sep 2024 15:49) (62 - 73)  BP: 136/83 (25 Sep 2024 15:49) (132/81 - 136/83)  BP(mean): --  RR: 16 (25 Sep 2024 15:49) (14 - 17)  SpO2: 96% (25 Sep 2024 15:49) (96% - 99%)    Parameters below as of 25 Sep 2024 15:49  Patient On (Oxygen Delivery Method): room air        General: No acute distress  HEENT: EOM intact, visual fields full  Abdomen: Soft, nontender, nondistended   Extremities: No edema    NEUROLOGICAL EXAM:  NIHSS is 0  Mental status: Awake, alert, oriented to month/year, however not to date. Answering aphasia screening slowly that would be expected; no neglect   Cranial Nerves: PERRL, EOMI, No facial asymmetry, no nystagmus, no dysarthria  Motor exam: Normal tone, no drift, 5/5 RUE, 5/5 RLE, 5/5 LUE, 5/5 LLE, normal fine finger movements.  Sensation: Intact to light touch   Coordination: No dysmetria, YADI intact and symmetric bilaterally    LABS:                        13.9   6.87  )-----------( 188      ( 25 Sep 2024 13:42 )             41.8    09-25    136  |  100  |  15  ----------------------------<  130[H]  3.3[L]   |  28  |  0.96    Ca    9.6      25 Sep 2024 13:42  Mg     1.9     09-25    TPro  7.5  /  Alb  4.2  /  TBili  0.9  /  DBili  x   /  AST  18  /  ALT  30  /  AlkPhos  56  09-25        IMAGING: Reviewed by me.

## 2024-09-25 NOTE — CONSULT NOTE ADULT - ASSESSMENT
This is a 59 year old gentleman with a history of kidney stones and TIA (on ASA 81 mg QD) who presented with a worsening headache for the past five days. Today he had two episodes of vomiting in row, which prompted ER visit to [facility]. Patient denies any falls, injury to the head or neck. He denies any other neurological complains. His last dose of ASA was this AM. CTH demonstrated b/l chronic SDH with some acute component; around 1 cm in size b/l; there was no MLS. CTH h/n has not demonstrated acute findings or vessel abnormalities.     There is no indication for OR treatment at this point given the reassuring exam and relatively small size of b/l SDH.   The best long term management will be a b/l MMA embolization.   We extensively discussed the procedure, indications and complications with the patient and his family at bedside.     - Agree with admission to NeuroICU and stability CTH at 2000   - NPO for MMA embolization tomorrow       Timmy Hester MD  NeuroIR Fellow

## 2024-09-25 NOTE — ED ADULT NURSE NOTE - CHIEF COMPLAINT QUOTE
Transfer from [facility] for non traumatic  b/l SDHs.  Takes ASA daily, no anticoags. C/o HA. Brought to critical care, MD called to bedside.

## 2024-09-25 NOTE — H&P ADULT - NSHPSOCIALHISTORY_GEN_ALL_CORE
Drinks 1-2 drinks each weekend night. Former smoker, quit 40 years ago. Denies drug use. Lives at home with wife.

## 2024-09-25 NOTE — ED ADULT TRIAGE NOTE - CHIEF COMPLAINT QUOTE
Transfer from [facility] for non traumatic  b/l SDHs.  Takes ASA daily, no anticoags. C/o HA. Brought to critical care, MD called to bedside.

## 2024-09-26 ENCOUNTER — APPOINTMENT (OUTPATIENT)
Dept: NEUROLOGY | Facility: HOSPITAL | Age: 59
End: 2024-09-26

## 2024-09-26 ENCOUNTER — RESULT REVIEW (OUTPATIENT)
Age: 59
End: 2024-09-26

## 2024-09-26 LAB
ABO RH CONFIRMATION: SIGNIFICANT CHANGE UP
ANION GAP SERPL CALC-SCNC: 11 MMOL/L — SIGNIFICANT CHANGE UP (ref 5–17)
APTT BLD: 31.1 SEC — SIGNIFICANT CHANGE UP (ref 24.5–35.6)
BUN SERPL-MCNC: 11.2 MG/DL — SIGNIFICANT CHANGE UP (ref 8–20)
CALCIUM SERPL-MCNC: 9.1 MG/DL — SIGNIFICANT CHANGE UP (ref 8.4–10.5)
CHLORIDE SERPL-SCNC: 106 MMOL/L — SIGNIFICANT CHANGE UP (ref 96–108)
CO2 SERPL-SCNC: 26 MMOL/L — SIGNIFICANT CHANGE UP (ref 22–29)
CREAT SERPL-MCNC: 0.7 MG/DL — SIGNIFICANT CHANGE UP (ref 0.5–1.3)
EGFR: 105 ML/MIN/1.73M2 — SIGNIFICANT CHANGE UP
GLUCOSE SERPL-MCNC: 135 MG/DL — HIGH (ref 70–99)
HCT VFR BLD CALC: 38.9 % — LOW (ref 39–50)
HGB BLD-MCNC: 13.2 G/DL — SIGNIFICANT CHANGE UP (ref 13–17)
INR BLD: 1 RATIO — SIGNIFICANT CHANGE UP (ref 0.85–1.16)
MAGNESIUM SERPL-MCNC: 2.1 MG/DL — SIGNIFICANT CHANGE UP (ref 1.6–2.6)
MCHC RBC-ENTMCNC: 30.6 PG — SIGNIFICANT CHANGE UP (ref 27–34)
MCHC RBC-ENTMCNC: 33.9 GM/DL — SIGNIFICANT CHANGE UP (ref 32–36)
MCV RBC AUTO: 90 FL — SIGNIFICANT CHANGE UP (ref 80–100)
PHOSPHATE SERPL-MCNC: 2.3 MG/DL — LOW (ref 2.4–4.7)
PLATELET # BLD AUTO: 193 K/UL — SIGNIFICANT CHANGE UP (ref 150–400)
POTASSIUM SERPL-MCNC: 3.4 MMOL/L — LOW (ref 3.5–5.3)
POTASSIUM SERPL-SCNC: 3.4 MMOL/L — LOW (ref 3.5–5.3)
PROTHROM AB SERPL-ACNC: 11.3 SEC — SIGNIFICANT CHANGE UP (ref 9.9–13.4)
RBC # BLD: 4.32 M/UL — SIGNIFICANT CHANGE UP (ref 4.2–5.8)
RBC # FLD: 13.8 % — SIGNIFICANT CHANGE UP (ref 10.3–14.5)
SODIUM SERPL-SCNC: 143 MMOL/L — SIGNIFICANT CHANGE UP (ref 135–145)
WBC # BLD: 5.36 K/UL — SIGNIFICANT CHANGE UP (ref 3.8–10.5)
WBC # FLD AUTO: 5.36 K/UL — SIGNIFICANT CHANGE UP (ref 3.8–10.5)

## 2024-09-26 PROCEDURE — 36224 PLACE CATH CAROTD ART: CPT | Mod: 50

## 2024-09-26 PROCEDURE — 75898 FOLLOW-UP ANGIOGRAPHY: CPT | Mod: 26,59

## 2024-09-26 PROCEDURE — 36226 PLACE CATH VERTEBRAL ART: CPT | Mod: 50

## 2024-09-26 PROCEDURE — 61624 TCAT PERM OCCLS/EMBOLJ CNS: CPT

## 2024-09-26 PROCEDURE — 99291 CRITICAL CARE FIRST HOUR: CPT

## 2024-09-26 PROCEDURE — 93306 TTE W/DOPPLER COMPLETE: CPT | Mod: 26

## 2024-09-26 PROCEDURE — 75894 X-RAYS TRANSCATH THERAPY: CPT | Mod: 26

## 2024-09-26 PROCEDURE — 36227 PLACE CATH XTRNL CAROTID: CPT | Mod: 50

## 2024-09-26 RX ORDER — SODIUM CHLORIDE 0.9 % (FLUSH) 0.9 %
500 SYRINGE (ML) INJECTION ONCE
Refills: 0 | Status: COMPLETED | OUTPATIENT
Start: 2024-09-26 | End: 2024-09-26

## 2024-09-26 RX ORDER — TRAMADOL HYDROCHLORIDE 50 MG/1
50 TABLET, COATED ORAL EVERY 6 HOURS
Refills: 0 | Status: DISCONTINUED | OUTPATIENT
Start: 2024-09-26 | End: 2024-09-28

## 2024-09-26 RX ORDER — VALPROIC ACID 250 MG/1
250 CAPSULE ORAL EVERY 6 HOURS
Refills: 0 | Status: DISCONTINUED | OUTPATIENT
Start: 2024-09-26 | End: 2024-09-29

## 2024-09-26 RX ORDER — ACETAMINOPHEN 325 MG
1000 TABLET ORAL ONCE
Refills: 0 | Status: COMPLETED | OUTPATIENT
Start: 2024-09-26 | End: 2024-09-26

## 2024-09-26 RX ORDER — MAGNESIUM SULFATE 500 MG/ML
2 VIAL (ML) INJECTION ONCE
Refills: 0 | Status: COMPLETED | OUTPATIENT
Start: 2024-09-26 | End: 2024-09-26

## 2024-09-26 RX ORDER — METOCLOPRAMIDE HCL 5 MG
10 TABLET ORAL ONCE
Refills: 0 | Status: COMPLETED | OUTPATIENT
Start: 2024-09-26 | End: 2024-09-26

## 2024-09-26 RX ORDER — POTASSIUM PHOSPHATE, MONOBASIC POTASSIUM PHOSPHATE, DIBASIC 224; 236 MG/ML; MG/ML
30 INJECTION, SOLUTION, CONCENTRATE INTRAVENOUS ONCE
Refills: 0 | Status: COMPLETED | OUTPATIENT
Start: 2024-09-26 | End: 2024-09-26

## 2024-09-26 RX ORDER — TRAMADOL HYDROCHLORIDE 50 MG/1
25 TABLET, COATED ORAL EVERY 6 HOURS
Refills: 0 | Status: DISCONTINUED | OUTPATIENT
Start: 2024-09-26 | End: 2024-09-28

## 2024-09-26 RX ORDER — ATORVASTATIN CALCIUM 10 MG/1
40 TABLET, FILM COATED ORAL AT BEDTIME
Refills: 0 | Status: DISCONTINUED | OUTPATIENT
Start: 2024-09-26 | End: 2024-10-02

## 2024-09-26 RX ADMIN — Medication 400 MILLIGRAM(S): at 08:16

## 2024-09-26 RX ADMIN — TRAMADOL HYDROCHLORIDE 50 MILLIGRAM(S): 50 TABLET, COATED ORAL at 10:27

## 2024-09-26 RX ADMIN — Medication 10 MILLIGRAM(S): at 10:05

## 2024-09-26 RX ADMIN — Medication 75 MILLILITER(S): at 18:47

## 2024-09-26 RX ADMIN — VALPROIC ACID 52.5 MILLIGRAM(S): 250 CAPSULE ORAL at 11:49

## 2024-09-26 RX ADMIN — Medication 1000 MILLIGRAM(S): at 00:12

## 2024-09-26 RX ADMIN — Medication 4 MILLIGRAM(S): at 09:12

## 2024-09-26 RX ADMIN — TRAMADOL HYDROCHLORIDE 50 MILLIGRAM(S): 50 TABLET, COATED ORAL at 09:27

## 2024-09-26 RX ADMIN — VALPROIC ACID 52.5 MILLIGRAM(S): 250 CAPSULE ORAL at 18:47

## 2024-09-26 RX ADMIN — POTASSIUM PHOSPHATE, MONOBASIC POTASSIUM PHOSPHATE, DIBASIC 83.33 MILLIMOLE(S): 224; 236 INJECTION, SOLUTION, CONCENTRATE INTRAVENOUS at 05:18

## 2024-09-26 RX ADMIN — CHLORHEXIDINE GLUCONATE ORAL RINSE 1 APPLICATION(S): 1.2 SOLUTION DENTAL at 12:16

## 2024-09-26 RX ADMIN — Medication 500 MILLIGRAM(S): at 05:18

## 2024-09-26 RX ADMIN — Medication 25 GRAM(S): at 10:04

## 2024-09-26 RX ADMIN — ATORVASTATIN CALCIUM 40 MILLIGRAM(S): 10 TABLET, FILM COATED ORAL at 21:13

## 2024-09-26 RX ADMIN — Medication 500 MILLILITER(S): at 10:05

## 2024-09-26 RX ADMIN — Medication 1000 MILLIGRAM(S): at 09:16

## 2024-09-26 NOTE — PROGRESS NOTE ADULT - ASSESSMENT
59M with PMH kidney stones, possible TIA on aspirin 81 mg daily who presented with HA x5-6 days, worsening today with N/V. CTH showed b/l SDH. Transferred to [facility] for neurosurgical evaluation.    Plan:  Neuro:  - Q1 hour Neuro checks, Q1 hour Vitals  - HOB 30 degrees, Neck midline position  - Maintain normothermia, PO acetaminophen for temp>38 C or pain  - Neurosurgical Imaging Reviewed  - Repeat imaging: CTH in 6 hours to establish stability  - MRI w/wo 2/2 non-traumatic SDH  - Continue AED: keppra 500 BID   - Pain management & Sedation: PRN tylenol   - Turn and Position Q2  /  Activity ad ronda, with assistance  	  CV:  - SBP Goal   - BP regimen: PRN hydralazine / labetalol     Pulm:  - Supplemental O2 PRN to maintain Spo2>92%  - Chest PT, OOB, Pulmonary Toilet    GI:  - Nutrition: regular diet, NPO @ Mn   - Bowel regimen: senna, miralax   	  Gu:  - Voiding  - Fluids: NS @ 75   - I&O Q1 hour  - Monitor Electrolytes & Renal Function    Heme:  - Monitor H&H  - ARU now  - Hold AC/Ap  - Chemical DVT prophylaxis: * Chemical DVT prophylaxis is contraindicated due to risk of bleeding  - Mechanical DVT Prophylaxis: Maintain B/L LE sequential compression devices  	  ID:  - Monitor WBC and Temperature  		  Endo  - Monitor BGL, maintain <180 59M with PMH kidney stones, possible TIA on aspirin 81 mg daily who presented with HA x5-6 days, worsening today with N/V. CTH showed b/l SDH. Transferred to [facility] for neurosurgical evaluation.    Plan:  Neuro:  - Q1 hour Neuro checks, Q1 hour Vitals  - plan for MMA embo today 9/26  - HOB 30 degrees, Neck midline position  - Maintain normothermia, PO acetaminophen for temp>38 C or pain  - sz ppx and HA - VPA 250mg IV q6h  - Pain management & Sedation: PRN tylenol, PRN tramadol   - Turn and Position Q2  /  Activity ad ronda, with assistance  	  CV:  - SBP Goal   - BP regimen: PRN hydralazine / labetalol     Pulm:  - Supplemental O2 PRN to maintain Spo2>92%  - Chest PT, OOB, Pulmonary Toilet    GI:  - Nutrition: regular diet, NPO @ Mn   - Bowel regimen: senna, miralax   	  Renal/:  - Fluids: NS @ 75   - I&O Q1 hour  - Monitor Electrolytes & Renal Function    Heme:  - Monitor H&H, ARU now  - Hold AC/AP  - SCDs, hold off chemoppx  	  ID:  - Monitor WBC and Temperature  		  Endo  - Monitor BGL, maintain <180    Dispo: pending clinical course

## 2024-09-26 NOTE — CHART NOTE - NSCHARTNOTEFT_GEN_A_CORE
Neurointerventional Surgery  Pre-Procedure Note     HPI:  59M with PMH kidney stones, possible TIA who presents today for b/l SDH. Per patient, he began having headaches about 5-6 days ago, came on gradually and now 8/10. He was taking intermittent tylenol and ibuprofen for pain which did help for some of the pain. He reports that the headache worsened and was associated with one episode of nausea and vomiting. Due to symptoms, he presented to [facility] for workup. He underwent CTH which showed b/l chronic on subacute on acute SDH, 1cm each with no MLS. He was transferred to [facility] for neurosurgical evaluation. He admits to taking baby aspirin daily due to possible TIA, last took this am. He denies weakness, numbness, tingling, dizziness, difficulty walking.  He continues to endorse WRIGHT during his admission. He presents today to Neuro-IR for MMA embolization of BL SDH    Allergies: No Known Allergies      PMH/PSH:  PAST MEDICAL & SURGICAL HISTORY:  Brain TIA  Kidney stones  No significant past surgical history      Social History:   Social History:  Drinks 1-2 drinks each weekend night. Former smoker, quit 40 years ago. Denies drug use. Lives at home with wife. (25 Sep 2024 16:53)      Current Medications:   chlorhexidine 2% Cloths 1 Application(s) Topical daily  influenza   Vaccine 0.5 milliLiter(s) IntraMuscular once  levETIRAcetam   Injectable 500 milliGRAM(s) IV Push every 12 hours  ondansetron Injectable 4 milliGRAM(s) IV Push every 6 hours PRN  polyethylene glycol 3350 17 Gram(s) Oral daily  senna 2 Tablet(s) Oral at bedtime  sodium chloride 0.9%. 1000 milliLiter(s) IV Continuous <Continuous>      Physical Exam:  Constitutional: NAD, lying in bed  Neuro  * Mental Status:  GCS 15: Awake, alert, oriented to conversation. No aphasia or difficulty speaking. No dysarthria. Able to name objects and their function.  * Cranial Nerves: Cnii-Cnxii grossly intact. PERRL, EOMI, tongue midline, no gaze deviation  * Motor: RUE 5/5, LUE 5/5, RLE 5/5, LLE 5/5, no drift or dysmetria  * Sensory: Sensation intact to light touch  * Reflexes: not assessed   Cardiovascular: Regular rate and rhythm.  Eyes: See neurologic examination with detailed examination of eyes.  ENT: No JVD, Trachea Midline  Respiratory: non labored breathing   Gastrointestinal: Soft, nontender, nondistended.  Genitourinary: [ ] Aleman, [ x ] No Aleman.   Musculoskeletal: No muscle wasting noted, (See neurologic assessment for full muscle strength assessment)   Skin:  no wounds, no redness, no abrasions noted  Hematologic / Lymph / Immunologic: No bleeding from IV sites or wounds    NIH SS:  DATE: 09/26/2024  TIME:  1A: Level of consciousness (0-3): 0  1B: Questions (0-2): 0    1C: Commands (0-2): 0  2: Gaze (0-2): 0  3: Visual fields (0-3): 0  4: Facial palsy (0-3): 0  MOTOR:  5A: Left arm motor drift (0-4): 0  5B: Right arm motor drift (0-4): 0  6A: Left leg motor drift (0-4): 0  6B: Right leg motor drift (0-4): 0  7: Limb ataxia (0-2): 0  SENSORY:  8: Sensation (0-2): 0  SPEECH:  9: Language (0-3): 0  10: Dysarthria (0-2): 0  EXTINCTION:  11: Extinction/inattention (0-2): 0    TOTAL SCORE:     Labs:                         13.2   5.36  )-----------( 193      ( 26 Sep 2024 02:30 )             38.9       09-26    143  |  106  |  11.2  ----------------------------<  135[H]  3.4[L]   |  26.0  |  0.70    Ca    9.1      26 Sep 2024 02:30  Phos  2.3     09-26  Mg     2.1     09-26    TPro  7.5  /  Alb  4.2  /  TBili  0.9  /  DBili  x   /  AST  18  /  ALT  30  /  AlkPhos  56  09-25      PT/INR - ( 26 Sep 2024 02:30 )   PT: 11.3 sec;   INR: 1.00 ratio         PTT - ( 26 Sep 2024 02:30 )  PTT:31.1 sec      Assessment/Plan:   This is a 59y year old Male who presents to neuro-IR for selective cerebral angiography.     Procedure, goals, risks, benefits and alternatives were discussed with patient and (patient's family).  All questions were answered.  Risks include but are not limited to stroke, vessel injury, hemorrhage, and or groin hematoma.  Patient demonstrates understanding of all risks involved with this procedure and wishes to continue.  Appropriate consent was obtained from patient and consent is in the patient's chart. Neurointerventional Surgery  Pre-Procedure Note     HPI:  59M with PMH kidney stones, possible TIA who presents today for b/l SDH. Per patient, he began having headaches about 5-6 days ago, came on gradually and now 8/10. He was taking intermittent tylenol and ibuprofen for pain which did help for some of the pain. He reports that the headache worsened and was associated with one episode of nausea and vomiting. Due to symptoms, he presented to [facility] for workup. He underwent CTH which showed b/l chronic on subacute on acute SDH, 1cm each with no MLS. He was transferred to [facility] for neurosurgical evaluation. He admits to taking baby aspirin daily due to possible TIA, last took this am. He denies weakness, numbness, tingling, dizziness, difficulty walking.  He continues to endorse WRIGHT during his admission. He presents today to Neuro-IR for MMA embolization of BL SDH    Allergies: No Known Allergies      PMH/PSH:  PAST MEDICAL & SURGICAL HISTORY:  Brain TIA  Kidney stones  No significant past surgical history      Social History:   Social History:  Drinks 1-2 drinks each weekend night. Former smoker, quit 40 years ago. Denies drug use. Lives at home with wife. (25 Sep 2024 16:53)      Current Medications:   chlorhexidine 2% Cloths 1 Application(s) Topical daily  influenza   Vaccine 0.5 milliLiter(s) IntraMuscular once  levETIRAcetam   Injectable 500 milliGRAM(s) IV Push every 12 hours  ondansetron Injectable 4 milliGRAM(s) IV Push every 6 hours PRN  polyethylene glycol 3350 17 Gram(s) Oral daily  senna 2 Tablet(s) Oral at bedtime  sodium chloride 0.9%. 1000 milliLiter(s) IV Continuous <Continuous>      Physical Exam:  Constitutional: NAD, lying in bed  Neuro  * Mental Status:  GCS 15: Awake, alert, oriented to conversation. No aphasia or difficulty speaking. No dysarthria. Able to name objects and their function.  * Cranial Nerves: Cnii-Cnxii grossly intact. PERRL, EOMI, tongue midline, no gaze deviation  * Motor: RUE 5/5, LUE 5/5, RLE 5/5, LLE 5/5, no drift or dysmetria  * Sensory: Sensation intact to light touch  * Reflexes: not assessed   Cardiovascular: Regular rate and rhythm.  Eyes: See neurologic examination with detailed examination of eyes.  ENT: No JVD, Trachea Midline  Respiratory: non labored breathing   Gastrointestinal: Soft, nontender, nondistended.  Genitourinary: [ ] Aleman, [ x ] No Aleman.   Musculoskeletal: No muscle wasting noted, (See neurologic assessment for full muscle strength assessment)   Skin:  no wounds, no redness, no abrasions noted  Hematologic / Lymph / Immunologic: No bleeding from IV sites or wounds    [facility] [[email or web address] SS:  DATE: 09/26/2024  TIME: 13:30  1A: Level of consciousness (0-3): 0  1B: Questions (0-2): 0    1C: Commands (0-2): 0  2: Gaze (0-2): 0  3: Visual fields (0-3): 0  4: Facial palsy (0-3): 0  MOTOR:  5A: Left arm motor drift (0-4): 0  5B: Right arm motor drift (0-4): 0  6A: Left leg motor drift (0-4): 0  6B: Right leg motor drift (0-4): 0  7: Limb ataxia (0-2): 0  SENSORY:  8: Sensation (0-2): 0  SPEECH:  9: Language (0-3): 0  10: Dysarthria (0-2): 0  EXTINCTION:  11: Extinction/inattention (0-2): 0    TOTAL SCORE: 0    Labs:                         13.2   5.36  )-----------( 193      ( 26 Sep 2024 02:30 )             38.9       09-26    143  |  106  |  11.2  ----------------------------<  135[H]  3.4[L]   |  26.0  |  0.70    Ca    9.1      26 Sep 2024 02:30  Phos  2.3     09-26  Mg     2.1     09-26    TPro  7.5  /  Alb  4.2  /  TBili  0.9  /  DBili  x   /  AST  18  /  ALT  30  /  AlkPhos  56  09-25      PT/INR - ( 26 Sep 2024 02:30 )   PT: 11.3 sec;   INR: 1.00 ratio         PTT - ( 26 Sep 2024 02:30 )  PTT:31.1 sec      Assessment/Plan:   This is a 59y year old Male who presents to neuro-IR for selective cerebral angiography w/poss MMA angiography.     Procedure, goals, risks, benefits and alternatives were discussed with patient and daughter.  All questions were answered.  Risks include but are not limited to stroke, vessel injury, hemorrhage, and or groin hematoma.  Patient demonstrates understanding of all risks involved with this procedure and wishes to continue.  Appropriate consent was obtained from patient and consent is in the patient's chart.

## 2024-09-26 NOTE — CHART NOTE - NSCHARTNOTEFT_GEN_A_CORE
Neurointerventional Surgery Post Procedure Note    Procedure: Selective Cerebral Angiography     Pre- Procedure Diagnosis: Spontaneous BL SDH  Post- Procedure Diagnosis: S/p BL MMA embolization, R MMA embolized with mike, L MMA embolized with mike, particles, coils    : Dr. Jori MD  Fellow: Javid Hester  Physician Assistant: Chanda Piper  Nurse: Flory Adams  Anesthesiologist: Dennis Moore CRNA                                         Radiology Tech: Roberto Dobson Evangelia    Sheath:  4 [place] Sheath    I/Os: estimated blood loss less than 50cc,  IV fluids 1L, Urine output 960cc, Contrast: Omnipaque 240   cc, ABX 2 g    Vitals:  BP   HR   Spo2  %    Preliminary Report:  Under a 4 [place] short sheath via the right groin under general sedation via left internal carotid artery, left external carotid artery, right internal carotid artery, right external carotid artery, a selective cerebral angiography was performed and reveals BL MMA embolization, R MMA embolized with mike, L MMA embolized with mike, particles, coils. ( Official note to follow).    Patient tolerated procedure well.  Patient remains hemodynamically stable, no change in neurological status compared to baseline.  Results were discussed with patient, patient's family and Neurosurgery.  Right groin sheath was discontinued 16:45, angioseal deployed. Hemostasis was obtained with approximately 10 minutes of manual compression.     No active bleeding, no hematoma, no ecchymosis.   Quick clot and safeguard balloon dressing applied at 16:55.   Patient transferred to NSICU in stable condition. Neurointerventional Surgery Post Procedure Note    Procedure: Selective Cerebral Angiography     Pre- Procedure Diagnosis: Spontaneous BL SDH  Post- Procedure Diagnosis: S/p BL MMA embolization, R MMA embolized with mike, L MMA embolized with mike, particles, coils    : Dr. Jori MD  Fellow: Javid Hester  Physician Assistant: Chanda Piper  Nurse: Flory Adams  Anesthesiologist: Dennis Moore CRNA                                         Radiology Tech: Roberto Dobson Evangelia    Sheath:  4 [place] Sheath    I/Os: estimated blood loss less than 50cc,  IV fluids 1L, Urine output 960cc, Contrast: Omnipaque 240   cc, ABX 2 g    Vitals:  BP 94/64  HR 71  Spo2  100%    Preliminary Report:  Under a 4 [place] short sheath via the right groin under general sedation via left internal carotid artery, left external carotid artery, right internal carotid artery, right external carotid artery, a selective cerebral angiography was performed and reveals BL MMA embolization, R MMA embolized with mike, L MMA embolized with mike, particles, coils. ( Official note to follow).    Patient tolerated procedure well.  Patient remains hemodynamically stable, no change in neurological status compared to baseline.  Results were discussed with patient, patient's family and Neurosurgery.  Right groin sheath was discontinued 16:45, angioseal deployed. Hemostasis was obtained with approximately 10 minutes of manual compression.     No active bleeding, no hematoma, no ecchymosis.   Quick clot and safeguard balloon dressing applied at 16:55.   Patient transferred to NSICU in stable condition.

## 2024-09-26 NOTE — PROGRESS NOTE ADULT - SUBJECTIVE AND OBJECTIVE BOX
NSICU ATTENDING PROGRESS NOTE    Historical Review:   59M with PMH kidney stones, possible TIA who presents today for b/l SDH. Per patient, he began having headaches about 5-6 days ago, came on gradually and now 8/10. He was taking intermittent tylenol and ibuprofen for pain which did help for some of the pain. He reports that the headache worsened today and was associated with one episode of nausea and vomiting. Due to symptoms, he presented to [facility] for workup. He underwent CTH which showed b/l chronic on subacute on acute SDH, 1cm each with no MLS. He was transferred to [facility] for neurosurgical evaluation. He admits to taking baby aspirin daily due to possible TIA, last took this am. He denies weakness, numbness, tingling, dizziness, difficulty walking.  (25 Sep 2024 16:53)      24hr EVENTS:      ROS: [ ]  Unable to assess due to mental status   All other systems negative    ----------------------------------------------------------------------------------------------------  PHYSICAL EXAM:  AO x3, FC, EO spont  face symmetric, tongue midline, EOMI, PERRLA  motor - JENKINS  Sensation intact to LT.  normal WOB, no accessory muscle use  RRR  Abd soft, NT, ND  No peripheral edema    -----------------------------------------------------------------------------------------------------  ICU Vital Signs Last 24 Hrs  T(C): 36.8 (26 Sep 2024 04:02), Max: 36.9 (25 Sep 2024 20:00)  T(F): 98.3 (26 Sep 2024 04:02), Max: 98.5 (25 Sep 2024 20:00)  HR: 59 (26 Sep 2024 07:00) (58 - 82)  BP: 136/98 (26 Sep 2024 07:00) (115/82 - 144/87)  BP(mean): 111 (26 Sep 2024 07:00) (92 - 111)  ABP: --  ABP(mean): --  RR: 11 (26 Sep 2024 07:00) (11 - 18)  SpO2: 95% (26 Sep 2024 07:00) (93% - 100%)    O2 Parameters below as of 25 Sep 2024 18:32  Patient On (Oxygen Delivery Method): room air            I&O's Summary    25 Sep 2024 07:01  -  26 Sep 2024 07:00  --------------------------------------------------------  IN: 1050 mL / OUT: 1200 mL / NET: -150 mL        MEDICATIONS  (STANDING):  chlorhexidine 2% Cloths 1 Application(s) Topical daily  influenza   Vaccine 0.5 milliLiter(s) IntraMuscular once  levETIRAcetam   Injectable 500 milliGRAM(s) IV Push every 12 hours  polyethylene glycol 3350 17 Gram(s) Oral daily  senna 2 Tablet(s) Oral at bedtime  sodium chloride 0.9%. 1000 milliLiter(s) (75 mL/Hr) IV Continuous <Continuous>      RESPIRATORY:        IMAGING:   Recent imaging studies were reviewed.    LAB RESULTS:                          13.2   5.36  )-----------( 193      ( 26 Sep 2024 02:30 )             38.9       PT/INR - ( 26 Sep 2024 02:30 )   PT: 11.3 sec;   INR: 1.00 ratio         PTT - ( 26 Sep 2024 02:30 )  PTT:31.1 sec    09-26    143  |  106  |  11.2  ----------------------------<  135[H]  3.4[L]   |  26.0  |  0.70    Ca    9.1      26 Sep 2024 02:30  Phos  2.3     09-26  Mg     2.1     09-26    TPro  7.5  /  Alb  4.2  /  TBili  0.9  /  DBili  x   /  AST  18  /  ALT  30  /  AlkPhos  56  09-25                -----------------------------------------------------------------------------------------------------------------------------------------------------------------------------------                   NSICU ATTENDING PROGRESS NOTE    Historical Review:   59M with PMH kidney stones, possible TIA who presents today for b/l SDH. Per patient, he began having headaches about 5-6 days ago, came on gradually and now 8/10. He was taking intermittent tylenol and ibuprofen for pain which did help for some of the pain. He reports that the headache worsened today and was associated with one episode of nausea and vomiting. Due to symptoms, he presented to [facility] for workup. He underwent CTH which showed b/l chronic on subacute on acute SDH, 1cm each with no MLS. He was transferred to [facility] for neurosurgical evaluation. He admits to taking baby aspirin daily due to possible TIA, last took this am. He denies weakness, numbness, tingling, dizziness, difficulty walking.  (25 Sep 2024 16:53)      24hr EVENTS:  9/26 - pain overnight -> IV tylenol, tramadol; MRI with dural thickening    ROS: HA as above, no other complaints    ----------------------------------------------------------------------------------------------------  PHYSICAL EXAM:  AO x3, FC, EO spont  face symmetric, tongue midline, EOMI, PERRLA  motor - JENKINS  Sensation intact to LT.  normal WOB, no accessory muscle use  RRR  Abd soft, NT, ND  No peripheral edema    -----------------------------------------------------------------------------------------------------  ICU Vital Signs Last 24 Hrs  T(C): 36.8 (26 Sep 2024 04:02), Max: 36.9 (25 Sep 2024 20:00)  T(F): 98.3 (26 Sep 2024 04:02), Max: 98.5 (25 Sep 2024 20:00)  HR: 59 (26 Sep 2024 07:00) (58 - 82)  BP: 136/98 (26 Sep 2024 07:00) (115/82 - 144/87)  BP(mean): 111 (26 Sep 2024 07:00) (92 - 111)  RR: 11 (26 Sep 2024 07:00) (11 - 18)  SpO2: 95% (26 Sep 2024 07:00) (93% - 100%)    O2 Parameters below as of 25 Sep 2024 18:32  Patient On (Oxygen Delivery Method): room air      I&O's Summary    25 Sep 2024 07:01  -  26 Sep 2024 07:00  --------------------------------------------------------  IN: 1050 mL / OUT: 1200 mL / NET: -150 mL        MEDICATIONS  (STANDING):  chlorhexidine 2% Cloths 1 Application(s) Topical daily  influenza   Vaccine 0.5 milliLiter(s) IntraMuscular once  levETIRAcetam   Injectable 500 milliGRAM(s) IV Push every 12 hours  polyethylene glycol 3350 17 Gram(s) Oral daily  senna 2 Tablet(s) Oral at bedtime  sodium chloride 0.9%. 1000 milliLiter(s) (75 mL/Hr) IV Continuous <Continuous>    IMAGING:   MRI as above    LAB RESULTS:             13.2   5.36  )-----------( 193      ( 26 Sep 2024 02:30 )             38.9       PT/INR - ( 26 Sep 2024 02:30 )   PT: 11.3 sec;   INR: 1.00 ratio      PTT - ( 26 Sep 2024 02:30 )  PTT:31.1 sec    09-26    143  |  106  |  11.2  ----------------------------<  135[H]  3.4[L]   |  26.0  |  0.70    Ca    9.1      26 Sep 2024 02:30  Phos  2.3     09-26  Mg     2.1     09-26    TPro  7.5  /  Alb  4.2  /  TBili  0.9  /  DBili  x   /  AST  18  /  ALT  30  /  AlkPhos  56  09-25    -----------------------------------------------------------------------------------------------------------------------------------------------------------------------------------

## 2024-09-26 NOTE — PHARMACOTHERAPY INTERVENTION NOTE - COMMENTS
Outpatient Medication Review updated and confirmed with [facility] outpatient fill records.    HOME MEDICATIONS:  aspirin 81 mg oral capsule: 1 cap(s) orally once a day (26 Sep 2024 11:45)  atorvastatin 40 mg oral tablet: 1 tab(s) orally once a day (26 Sep 2024 10:01)

## 2024-09-26 NOTE — PROGRESS NOTE ADULT - SUBJECTIVE AND OBJECTIVE BOX
HPI:  59M with PMH kidney stones, possible TIA who presents today for b/l SDH. Per patient, he began having headaches about 5-6 days ago, came on gradually and now 8/10. He was taking intermittent tylenol and ibuprofen for pain which did help for some of the pain. He reports that the headache worsened today and was associated with one episode of nausea and vomiting. Due to symptoms, he presented to [facility] for workup. He underwent CTH which showed b/l chronic on subacute on acute SDH, 1cm each with no MLS. He was transferred to [facility] for neurosurgical evaluation. He admits to taking baby aspirin daily due to possible TIA, last took this am. He denies weakness, numbness, tingling, dizziness, difficulty walking.  (25 Sep 2024 16:53)    OVERNIGHT EVENTS: Patient reports headache, given PRN tylenol. Patient denies any other acute complaints. Pre-op for MMA embo in AM.     Vital Signs Last 24 Hrs  T(C): 36.6 (25 Sep 2024 23:50), Max: 36.9 (25 Sep 2024 20:00)  T(F): 97.9 (25 Sep 2024 23:50), Max: 98.5 (25 Sep 2024 20:00)  HR: 63 (26 Sep 2024 00:00) (62 - 82)  BP: 126/83 (26 Sep 2024 00:00) (126/83 - 144/87)  BP(mean): 97 (26 Sep 2024 00:00) (97 - 105)  RR: 15 (26 Sep 2024 00:00) (12 - 18)  SpO2: 96% (26 Sep 2024 00:00) (96% - 100%)    Parameters below as of 25 Sep 2024 18:32  Patient On (Oxygen Delivery Method): room air    I&O's Summary    25 Sep 2024 07:01  -  26 Sep 2024 00:57  --------------------------------------------------------  IN: 525 mL / OUT: 700 mL / NET: -175 mL    PHYSICAL EXAM:  General: NAD, pt is comfortably sitting up in bed, on room air  HEENT: EOMI b/l, face symmetric, tongue midline, neck FROM  Cardiovascular: Regular rate and rhythm  Respiratory: nonlabored breathing, normal chest rise  GI: abdomen soft, nontender, nondistended  Neuro: CN II-XII grossly intact, PERRL 3mm briskly reactive   A&Ox3, No aphasia, speech clear, no dysmetria, no pronator drift. Follows commands.  JENKINS x4 spontaneously, 5/5 strength in all extremities throughout. sensation intact  Extremities: distal pulses 2+ x4    LABS:                        13.9   6.87  )-----------( 188      ( 25 Sep 2024 13:42 )             41.8     09-25    136  |  100  |  15  ----------------------------<  130[H]  3.3[L]   |  28  |  0.96    Ca    9.6      25 Sep 2024 13:42  Mg     1.9     09-25  TPro  7.5  /  Alb  4.2  /  TBili  0.9  /  DBili  x   /  AST  18  /  ALT  30  /  AlkPhos  56  09-25  Urinalysis Basic - ( 25 Sep 2024 13:42 )  Color: x / Appearance: x / SG: x / pH: x  Gluc: 130 mg/dL / Ketone: x  / Bili: x / Urobili: x   Blood: x / Protein: x / Nitrite: x   Leuk Esterase: x / RBC: x / WBC x   Sq Epi: x / Non Sq Epi: x / Bacteria: x    CAPILLARY BLOOD GLUCOSE  POCT Blood Glucose.: 106 mg/dL (25 Sep 2024 18:45)    Allergies  No Known Allergies    MEDICATIONS:  levETIRAcetam   Injectable 500 milliGRAM(s) IV Push every 12 hours  ondansetron Injectable 4 milliGRAM(s) IV Push every 6 hours PRN  chlorhexidine 2% Cloths 1 Application(s) Topical daily  influenza   Vaccine 0.5 milliLiter(s) IntraMuscular once  polyethylene glycol 3350 17 Gram(s) Oral daily  senna 2 Tablet(s) Oral at bedtime  sodium chloride 0.9%. 1000 milliLiter(s) IV Continuous <Continuous>    RADIOLOGY & ADDITIONAL TESTS:  < from: CT Angio Neck w/ IV Cont (09.25.24 @ 14:29) >  IMPRESSION:  1. Bilateral holohemispheric subdural hematomas which are multiloculated   and of mixed density, suggesting both chronic, subacute, and acute   components. Both collections measure approximately 1 cm without   significant midline shift. Gyral and sulcal effacement.  2. No intraparenchymal hemorrhage, or infarct.  3. Widely patent head and neck vasculature, as outlined above.  4. No acute cervical fracture identified. Underlying degenerative changes   most extensive at C5-C6. Upper thoracic degenerative changes also noted.  Follow-up MR imaging of the brain recommended for further assessment and   characterization of the bilateral subdural collections.    < from: CT Cervical Spine No Cont (09.25.24 @ 14:28) >  IMPRESSION:  1. Bilateral holohemispheric subdural hematomas which are multiloculated   and of mixed density, suggesting both chronic, subacute, and acute   components. Both collections measure approximately 1 cm without   significant midline shift. Gyral and sulcal effacement.  2. No intraparenchymal hemorrhage, or infarct.  3. Widely patent head and neck vasculature, as outlined above.  4. No acute cervical fracture identified. Underlying degenerative changes   most extensive at C5-C6. Upper thoracic degenerative changes also noted.  Follow-up MR imaging of the brain recommended for further assessment and   characterization of the bilateral subdural collections.      ASSESSMENT:  59M with PMHx of kidney stones, possible TIA on ASA 81mg who presented to [place] ED 9/25 c/o worsening headache with associated nausea and vomiting that started about 5-6 days ago found to have b/l acute on chronic SDH. Patient was transferred to [facility] for further neurosurgical evaluation.     PLAN:   - neuro checks q1hr   - Keppra 500mg BID   - pre-op for MMA embolization in AM    - MRI brain w+w/o contrast done, pend read   - tolerating RA  - SBP goal    - NS@75 while NPO   - regular diet, NPO after MN  - DVT ppx: SCDs  - hold ASA,   - further supportive care/medical management as per NSICU   - final plan pend discussion with neurosurgeon in AM

## 2024-09-26 NOTE — PROGRESS NOTE ADULT - CRITICAL CARE ATTENDING COMMENT
Pt is critically ill and at high risk of rapid deterioration/death due to abovementioned conditions.   Still requires critical care interventions - additional time spent for ongoing frequent evaluations, interventions and management adjustment by the Attending and ICU team, - and included review of relevant history, clinical examination, review of data and images, discussion of treatment with the multidisciplinary team and any consultants involved in this patient’s care, with family at the bedside.
I have personally provided the above noted minutes of critical care time including examining patient, review of laboratory values, imaging, interdisciplinary care coordination, updating family, and frequent monitoring for decompensation.    Based on my personal evaluation, this patient has a high probability of imminent or life-threatening deterioration due to the presence of: SDH, headache, anti-platelet use -  which required my direct attention, intervention, and personal management. Other billable procedures, if performed, are documented separately.

## 2024-09-27 ENCOUNTER — TRANSCRIPTION ENCOUNTER (OUTPATIENT)
Age: 59
End: 2024-09-27

## 2024-09-27 LAB
A1C WITH ESTIMATED AVERAGE GLUCOSE RESULT: 5.6 % — SIGNIFICANT CHANGE UP (ref 4–5.6)
ANION GAP SERPL CALC-SCNC: 14 MMOL/L — SIGNIFICANT CHANGE UP (ref 5–17)
BUN SERPL-MCNC: 11.4 MG/DL — SIGNIFICANT CHANGE UP (ref 8–20)
CALCIUM SERPL-MCNC: 8.3 MG/DL — LOW (ref 8.4–10.5)
CHLORIDE SERPL-SCNC: 107 MMOL/L — SIGNIFICANT CHANGE UP (ref 96–108)
CO2 SERPL-SCNC: 24 MMOL/L — SIGNIFICANT CHANGE UP (ref 22–29)
CREAT SERPL-MCNC: 0.87 MG/DL — SIGNIFICANT CHANGE UP (ref 0.5–1.3)
EGFR: 98 ML/MIN/1.73M2 — SIGNIFICANT CHANGE UP
ESTIMATED AVERAGE GLUCOSE: 114 MG/DL — SIGNIFICANT CHANGE UP (ref 68–114)
GLUCOSE SERPL-MCNC: 146 MG/DL — HIGH (ref 70–99)
HCT VFR BLD CALC: 36.1 % — LOW (ref 39–50)
HGB BLD-MCNC: 12.2 G/DL — LOW (ref 13–17)
MAGNESIUM SERPL-MCNC: 2.1 MG/DL — SIGNIFICANT CHANGE UP (ref 1.8–2.6)
MCHC RBC-ENTMCNC: 30.7 PG — SIGNIFICANT CHANGE UP (ref 27–34)
MCHC RBC-ENTMCNC: 33.8 GM/DL — SIGNIFICANT CHANGE UP (ref 32–36)
MCV RBC AUTO: 90.7 FL — SIGNIFICANT CHANGE UP (ref 80–100)
PHOSPHATE SERPL-MCNC: 3.1 MG/DL — SIGNIFICANT CHANGE UP (ref 2.4–4.7)
PLATELET # BLD AUTO: 182 K/UL — SIGNIFICANT CHANGE UP (ref 150–400)
POTASSIUM SERPL-MCNC: 3.3 MMOL/L — LOW (ref 3.5–5.3)
POTASSIUM SERPL-SCNC: 3.3 MMOL/L — LOW (ref 3.5–5.3)
RBC # BLD: 3.98 M/UL — LOW (ref 4.2–5.8)
RBC # FLD: 13.8 % — SIGNIFICANT CHANGE UP (ref 10.3–14.5)
SODIUM SERPL-SCNC: 145 MMOL/L — SIGNIFICANT CHANGE UP (ref 135–145)
TSH SERPL-MCNC: 0.19 UIU/ML — LOW (ref 0.27–4.2)
WBC # BLD: 8.35 K/UL — SIGNIFICANT CHANGE UP (ref 3.8–10.5)
WBC # FLD AUTO: 8.35 K/UL — SIGNIFICANT CHANGE UP (ref 3.8–10.5)

## 2024-09-27 PROCEDURE — 99232 SBSQ HOSP IP/OBS MODERATE 35: CPT

## 2024-09-27 PROCEDURE — 70450 CT HEAD/BRAIN W/O DYE: CPT | Mod: 26

## 2024-09-27 PROCEDURE — 72146 MRI CHEST SPINE W/O DYE: CPT | Mod: 26

## 2024-09-27 PROCEDURE — 72148 MRI LUMBAR SPINE W/O DYE: CPT | Mod: 26

## 2024-09-27 PROCEDURE — 72141 MRI NECK SPINE W/O DYE: CPT | Mod: 26

## 2024-09-27 RX ORDER — ACETAMINOPHEN 325 MG
1000 TABLET ORAL ONCE
Refills: 0 | Status: COMPLETED | OUTPATIENT
Start: 2024-09-27 | End: 2024-09-27

## 2024-09-27 RX ORDER — MAGNESIUM SULFATE 500 MG/ML
2 VIAL (ML) INJECTION ONCE
Refills: 0 | Status: COMPLETED | OUTPATIENT
Start: 2024-09-27 | End: 2024-09-27

## 2024-09-27 RX ORDER — METOCLOPRAMIDE HCL 5 MG
10 TABLET ORAL ONCE
Refills: 0 | Status: COMPLETED | OUTPATIENT
Start: 2024-09-27 | End: 2024-09-27

## 2024-09-27 RX ORDER — SODIUM CHLORIDE 0.9 % (FLUSH) 0.9 %
500 SYRINGE (ML) INJECTION ONCE
Refills: 0 | Status: COMPLETED | OUTPATIENT
Start: 2024-09-27 | End: 2024-09-27

## 2024-09-27 RX ORDER — SODIUM CHLORIDE 0.9 % (FLUSH) 0.9 %
1000 SYRINGE (ML) INJECTION ONCE
Refills: 0 | Status: COMPLETED | OUTPATIENT
Start: 2024-09-27 | End: 2024-09-27

## 2024-09-27 RX ADMIN — TRAMADOL HYDROCHLORIDE 50 MILLIGRAM(S): 50 TABLET, COATED ORAL at 09:25

## 2024-09-27 RX ADMIN — TRAMADOL HYDROCHLORIDE 25 MILLIGRAM(S): 50 TABLET, COATED ORAL at 08:51

## 2024-09-27 RX ADMIN — Medication 1000 MILLIGRAM(S): at 01:30

## 2024-09-27 RX ADMIN — Medication 400 MILLIGRAM(S): at 01:04

## 2024-09-27 RX ADMIN — Medication 20 MILLIEQUIVALENT(S): at 06:21

## 2024-09-27 RX ADMIN — Medication 40 MILLIEQUIVALENT(S): at 06:21

## 2024-09-27 RX ADMIN — Medication 2 TABLET(S): at 21:35

## 2024-09-27 RX ADMIN — VALPROIC ACID 52.5 MILLIGRAM(S): 250 CAPSULE ORAL at 00:48

## 2024-09-27 RX ADMIN — Medication 150 GRAM(S): at 21:34

## 2024-09-27 RX ADMIN — Medication 400 MILLIGRAM(S): at 19:43

## 2024-09-27 RX ADMIN — TRAMADOL HYDROCHLORIDE 50 MILLIGRAM(S): 50 TABLET, COATED ORAL at 17:00

## 2024-09-27 RX ADMIN — VALPROIC ACID 52.5 MILLIGRAM(S): 250 CAPSULE ORAL at 13:56

## 2024-09-27 RX ADMIN — Medication 10 MILLIGRAM(S): at 21:34

## 2024-09-27 RX ADMIN — Medication 1000 MILLILITER(S): at 21:34

## 2024-09-27 RX ADMIN — Medication 1000 MILLIGRAM(S): at 20:30

## 2024-09-27 RX ADMIN — Medication 500 MILLILITER(S): at 13:56

## 2024-09-27 RX ADMIN — VALPROIC ACID 52.5 MILLIGRAM(S): 250 CAPSULE ORAL at 17:37

## 2024-09-27 RX ADMIN — VALPROIC ACID 52.5 MILLIGRAM(S): 250 CAPSULE ORAL at 06:21

## 2024-09-27 RX ADMIN — TRAMADOL HYDROCHLORIDE 50 MILLIGRAM(S): 50 TABLET, COATED ORAL at 16:00

## 2024-09-27 RX ADMIN — ATORVASTATIN CALCIUM 40 MILLIGRAM(S): 10 TABLET, FILM COATED ORAL at 21:34

## 2024-09-27 RX ADMIN — CHLORHEXIDINE GLUCONATE ORAL RINSE 1 APPLICATION(S): 1.2 SOLUTION DENTAL at 13:55

## 2024-09-27 RX ADMIN — Medication 75 MILLILITER(S): at 13:54

## 2024-09-27 RX ADMIN — TRAMADOL HYDROCHLORIDE 50 MILLIGRAM(S): 50 TABLET, COATED ORAL at 10:25

## 2024-09-27 RX ADMIN — TRAMADOL HYDROCHLORIDE 25 MILLIGRAM(S): 50 TABLET, COATED ORAL at 07:51

## 2024-09-27 NOTE — PROGRESS NOTE ADULT - SUBJECTIVE AND OBJECTIVE BOX
Patient is a 59y old  Male who presents with a chief complaint of Non-traumatic subdural hemorrhage     (27 Sep 2024 11:26)    HPI:  59M with PMH kidney stones, possible TIA who presents today for b/l SDH. Per patient, he began having headaches about 5-6 days ago, came on gradually and now 8/10. He was taking intermittent tylenol and ibuprofen for pain which did help for some of the pain. He reports that the headache worsened today and was associated with one episode of nausea and vomiting. Due to symptoms, he presented to [facility] for workup. He underwent CTH which showed b/l chronic on subacute on acute SDH, 1cm each with no MLS. He was transferred to [facility] for neurosurgical evaluation. He admits to taking baby aspirin daily due to possible TIA, last took this am. He denies weakness, numbness, tingling, dizziness, difficulty walking.  (25 Sep 2024 16:53)      Interval history:  Patient seen and examined by neuro IR team. Patient POD1 from b/l MMA embolization. Patient reports that he continues with headaches, remaining stable. CTH this am stable. Patient underwent MRI C/T/L spine, results below.       Vital Signs Last 24 Hrs  T(C): 37.1 (27 Sep 2024 16:01), Max: 37.4 (27 Sep 2024 00:00)  T(F): 98.8 (27 Sep 2024 16:01), Max: 99.3 (27 Sep 2024 00:00)  HR: 64 (27 Sep 2024 16:00) (59 - 82)  BP: 116/72 (27 Sep 2024 16:00) (97/58 - 158/100)  BP(mean): 85 (27 Sep 2024 16:00) (70 - 117)  RR: 14 (27 Sep 2024 16:00) (12 - 19)  SpO2: 99% (27 Sep 2024 16:00) (97% - 100%)    Parameters below as of 27 Sep 2024 16:00  Patient On (Oxygen Delivery Method): room air      Physical Exam:  Constitutional: NAD, lying in bed  Neuro  * Mental Status:  GCS 15: Awake, alert, oriented to conversation. No aphasia or difficulty speaking. No dysarthria.  * Cranial Nerves: Cnii-Cnxii grossly intact. PERRL, EOMI, tongue midline, no gaze deviation  * Motor: RUE 5/5, LUE 5/5, RLE 5/5, LLE 5/5, no drift or dysmetria  * Sensory: Sensation intact to light touch  * Reflexes: not assessed   Groin: soft, nontender, no hematoma, no ecchymoses       LABS:                        12.2   8.35  )-----------( 182      ( 27 Sep 2024 03:30 )             36.1     09-27    145  |  107  |  11.4  ----------------------------<  146[H]  3.3[L]   |  24.0  |  0.87    Ca    8.3[L]      27 Sep 2024 03:30  Phos  3.1     09-27  Mg     2.1     09-27    PT/INR - ( 26 Sep 2024 02:30 )   PT: 11.3 sec;   INR: 1.00 ratio    PTT - ( 26 Sep 2024 02:30 )  PTT:31.1 sec      Medications:  MEDICATIONS  (STANDING):  atorvastatin 40 milliGRAM(s) Oral at bedtime  chlorhexidine 2% Cloths 1 Application(s) Topical daily  influenza   Vaccine 0.5 milliLiter(s) IntraMuscular once  polyethylene glycol 3350 17 Gram(s) Oral daily  senna 2 Tablet(s) Oral at bedtime  sodium chloride 0.9%. 1000 milliLiter(s) (75 mL/Hr) IV Continuous <Continuous>  valproate sodium   IVPB 250 milliGRAM(s) IV Intermittent every 6 hours    MEDICATIONS  (PRN):  ondansetron Injectable 4 milliGRAM(s) IV Push every 6 hours PRN Nausea and/or Vomiting  traMADol 50 milliGRAM(s) Oral every 6 hours PRN Severe Pain (7 - 10)  traMADol 25 milliGRAM(s) Oral every 6 hours PRN Moderate Pain (4 - 6)      RADIOLOGY & ADDITIONAL STUDIES:  < from: MR Thoracic Spine No Cont (09.27.24 @ 13:08) >  CERVICAL SPINE MRI:  Severe disc desiccation at C5/C6. Mild retrolisthesis of C5 over C6.  Unremarkable T1 marrow signal. No cord signal abnormality. No evidence of   cord compression. No bone marrow edema. There isno evidence for acute   fracture. A normal lordosis is noted. Craniocervical junction is normal.   The cervicovertebral body heights and intervertebral disc spaces are   preserved. There is no prevertebral soft tissue abnormality. No evidence   of CSF leak.    Evaluation of the individual levels:  C2/C3 level: No spinal canal stenosis or foraminal narrowing.  C3/C4 level: No spinal canal stenosis or foraminal narrowing.  C4/C5 level: No spinal canal stenosis or foraminal narrowing.  C5/C6 level: Broad-based disc herniation. Mild flattening the ventral   aspect of the cord. Mild bilateral foraminal narrowing. No spinal canal   stenosis.  C6/C7 level: No spinal canal stenosis or foraminal narrowing.  C7/T1 level:  No spinal canal stenosis or foraminal narrowing. Small left   foraminal perineural root sleeve cyst. Tiny right foraminal perineural   root sleeve cyst.    THORACIC SPINE MRI:  Moderate multilevel discogenic degenerative changes. No evidence of cord   compression course abnormality. No soft tissue abnormality. No evidence   of CSF leak.    Moderate size hemangioma in the T9 vertebral body. Tiny bilateral T1/T2   foraminal perineural root sleeve cyst. Small left T4/T5 foraminal   perineural root sleeve cyst. Small right T6/T7 foraminal perineural root   sleeve cyst. Small right T7/T8 foraminal perineural root sleeve cyst.   Small right T8/T9 perineural root sleeve cyst. Moderate left T10/T11, 1.2   cm, perineural root sleeve cyst. Tiny left T11/T12 foraminal perineural   root sleeve cyst.    LUMBAR SPINE MRI:  The conus medullaris ends at the distal L1 level. Vertebral body heights   are maintained. Severe loss of disc space height at L5/S1. 6.3 mm   anterolisthesis of L5 over S1. No bone marrow edema. Mild nonspecific   diffuse posterior subcutaneous edema. Shallow broad-based disc herniation   at L4/L5 with a midline annular fissure. No spinal canal stenosis or   foraminal narrowing at any level. No evidence of a CSF leak.    IMPRESSION:    No evidence of a CSF leak. Multilevel thoracic spine foraminal perineural   sleeve cysts.    --- End of Report ---  KAILEE CHRISTIE MD; Attending Radiologist  This document has been electronically signed. Sep 27 2024  3:01PM    < end of copied text >

## 2024-09-27 NOTE — PROGRESS NOTE ADULT - SUBJECTIVE AND OBJECTIVE BOX
HPI:  59M with PMH kidney stones, possible TIA who presents today for b/l SDH. Per patient, he began having headaches about 5-6 days ago, came on gradually and now 8/10. He was taking intermittent tylenol and ibuprofen for pain which did help for some of the pain. He reports that the headache worsened today and was associated with one episode of nausea and vomiting. Due to symptoms, he presented to [facility] for workup. He underwent CTH which showed b/l chronic on subacute on acute SDH, 1cm each with no MLS. He was transferred to [facility] for neurosurgical evaluation. He admits to taking baby aspirin daily due to possible TIA, last took this am. He denies weakness, numbness, tingling, dizziness, difficulty walking.  (25 Sep 2024 16:53)      INTERVAL HPI/OVERNIGHT EVENTS:  9/25: admitted.   9/26: HA given ofirmev w/o improvment. Gave migraine cocktail. Switched from Keppra to VPA to help w HA. Angio for b/l mma embo.  9/27: 59y Male s/p MMA embo seen lying comfortably in bed. Tolerating diet. Passing gas/BM. Aleman in with clear urine. C/o mild headache, denies weakness, numbness, n/v/d, fevers, chills, chest pain, SOB.     Vital Signs Last 24 Hrs  T(C): 37.4 (27 Sep 2024 00:00), Max: 37.4 (27 Sep 2024 00:00)  T(F): 99.3 (27 Sep 2024 00:00), Max: 99.3 (27 Sep 2024 00:00)  HR: 64 (27 Sep 2024 00:00) (58 - 82)  BP: 129/78 (27 Sep 2024 00:00) (115/82 - 156/83)  BP(mean): 93 (27 Sep 2024 00:00) (87 - 111)  RR: 16 (27 Sep 2024 00:00) (10 - 19)  SpO2: 98% (27 Sep 2024 00:00) (93% - 100%)    Parameters below as of 27 Sep 2024 00:00  Patient On (Oxygen Delivery Method): room air        PHYSICAL EXAM:  General: NAD, pt is comfortably sitting up in bed, on room air  HEENT: EOMI b/l, face symmetric, tongue midline, neck FROM  Cardiovascular: Regular rate and rhythm  Respiratory: nonlabored breathing, normal chest rise  GI: abdomen soft, nontender, nondistended  Neuro: CN II-XII grossly intact, PERRL 3mm briskly reactive   A&Ox3, No aphasia, speech clear, no dysmetria, no pronator drift. Follows commands.  JENKINS x4 spontaneously, 5/5 strength in all extremities throughout. sensation intact  Extremities: distal pulses 2+ x4  LABS:                        13.2   5.36  )-----------( 193      ( 26 Sep 2024 02:30 )             38.9     09-26    143  |  106  |  11.2  ----------------------------<  135[H]  3.4[L]   |  26.0  |  0.70    Ca    9.1      26 Sep 2024 02:30  Phos  2.3     09-26  Mg     2.1     09-26    TPro  7.5  /  Alb  4.2  /  TBili  0.9  /  DBili  x   /  AST  18  /  ALT  30  /  AlkPhos  56  09-25    PT/INR - ( 26 Sep 2024 02:30 )   PT: 11.3 sec;   INR: 1.00 ratio         PTT - ( 26 Sep 2024 02:30 )  PTT:31.1 sec  Urinalysis Basic - ( 26 Sep 2024 02:30 )    Color: x / Appearance: x / SG: x / pH: x  Gluc: 135 mg/dL / Ketone: x  / Bili: x / Urobili: x   Blood: x / Protein: x / Nitrite: x   Leuk Esterase: x / RBC: x / WBC x   Sq Epi: x / Non Sq Epi: x / Bacteria: x        09-25 @ 07:01 - 09-26 @ 07:00  --------------------------------------------------------  IN: 1133 mL / OUT: 1200 mL / NET: -67 mL    09-26 @ 07:01 - 09-27 @ 01:35  --------------------------------------------------------  IN: 1917.5 mL / OUT: 2175 mL / NET: -257.5 mL        RADIOLOGY & ADDITIONAL TESTS:  9/25 CTH: b/l holohemispheric SDH which are multiloculated and of mixed density, suggesting both chronic, subacute, and acute components. Both collections measure approximately 1 cm without significant midline shift. Gyral and sulcal effacement.    9/25 CT C-spine: No acute cervical fracture identified. Underlying degenerative changes most extensive at C5-C6. Upper thoracic degenerative changes also noted.    9/25 MRB: Mildly heterogeneous holohemispheric 9mm b/l SDH    9/26 TTE: EF 66%, basal inferior segment abnormal     ASSESSMENT AND PLAN:   ASSESSMENT:  59M with PMHx of kidney stones, possible TIA on ASA 81mg who presented to [place] ED 9/25 c/o worsening headache with associated nausea and vomiting that started about 5-6 days ago found to have b/l acute on chronic SDH. Patient was transferred to [facility] for further neurosurgical evaluation.     PLAN:   - neuro checks q1hr   - D/c Keppra 500mg BID; started VPA i/s/o HA   - now s/p MMA embo doing well    - F/u CTH in AM   - MRI brain w+w/o contrast done and read   - tolerating RA  - SBP goal     - regular diet   - DVT ppx: SCDs  - hold ASA  - further supportive care/medical management as per NSICU   - final plan pend discussion with neurosurgeon in AM

## 2024-09-27 NOTE — PROGRESS NOTE ADULT - NSPROGADDITIONALINFOA_GEN_ALL_CORE
JIM Attg:    see above    patient seen and examined by PA staff    agree with above
NSGY Attg:    see above    patient seen and examined by PA staff (in MRI at time of my rounds)    agree with exam as above    imaging reviewed and discussed with Dr. Hsieh    would favor following clinically and radiographically s/p MMA embo prior to consideration of myelogram or empiric LP for possibly incidental perineural cysts given no CSF leak visualized on MRI

## 2024-09-27 NOTE — CHART NOTE - NSCHARTNOTEFT_GEN_A_CORE
HPI:  HPI:  59M with PMH kidney stones, possible TIA who presents today for b/l SDH. Per patient, he began having headaches about 5-6 days ago, came on gradually and now 8/10. He was taking intermittent tylenol and ibuprofen for pain which did help for some of the pain. He reports that the headache worsened today and was associated with one episode of nausea and vomiting. Due to symptoms, he presented to [facility] for workup. He underwent CTH which showed b/l chronic on subacute on acute SDH, 1cm each with no MLS. He was transferred to [facility] for neurosurgical evaluation. He admits to taking baby aspirin daily due to possible TIA, last took this am. He denies weakness, numbness, tingling, dizziness, difficulty walking.  (25 Sep 2024 16:53)      INTERVAL HPI/OVERNIGHT EVENTS:  59y Male s/p __ seen lying comfortably in bed. Tolerating diet. Passing gas/BM. Voiding. Aleman in with __ clear urine. Denies headache, weakness, numbness, n/v/d, fevers, chills, chest pain, SOB.     Vital Signs Last 24 Hrs  T(C): 37.1 (27 Sep 2024 16:01), Max: 37.4 (27 Sep 2024 00:00)  T(F): 98.8 (27 Sep 2024 16:01), Max: 99.3 (27 Sep 2024 00:00)  HR: 64 (27 Sep 2024 16:00) (59 - 82)  BP: 116/72 (27 Sep 2024 16:00) (97/58 - 158/100)  BP(mean): 85 (27 Sep 2024 16:00) (70 - 117)  RR: 14 (27 Sep 2024 16:00) (10 - 19)  SpO2: 99% (27 Sep 2024 16:00) (97% - 100%)    Parameters below as of 27 Sep 2024 16:00  Patient On (Oxygen Delivery Method): room air        PHYSICAL EXAM:  GENERAL: NAD, well-groomed  HEAD:  Atraumatic, normocephalic  DRAINS:   WOUND: Dressing clean dry intact  WILLIAM COMA SCORE: E- V- M- =       E: 4= opens eyes spontaneously 3= to voice 2= to noxious 1= no opening       V: 5= oriented 4= confused 3= inappropriate words 2= incomprehensible sounds 1= nonverbal 1T= intubated       M: 6= follows commands 5= localizes 4= withdraws 3= flexor posturing 2= extensor posturing 1= no movement  MENTAL STATUS: AAO x3; Awake/Comatose; Opens eyes spontaneously/to voice/to light touch/to noxious stimuli; Appropriately conversant without aphasia/Nonverbal; following simple commands/mimicking/not following commands  CRANIAL NERVES: Visual acuity normal for age, visual fields full to confrontation, PERRL. EOMI without nystagmus. Facial sensation intact V1-3 distribution b/l. Face symmetric w/ normal eye closure and smile, tongue midline. Hearing grossly intact. Speech clear. Head turning and shoulder shrug intact.   REFLEXES: PERRL. Corneals intact b/l. Gag intact. Cough intact. Oculocephalic reflex intact (Doll's eye). Negative Arias's b/l. Negative clonus b/l  MOTOR: strength 5/5 b/l upper and lower extremities  Uppers     Delt (C5/6)     Bicep (C5/6)     Wrist Extend (C6)     Tricep (C7)     HG (C8/T1)  R                     5/5                 5/5                         5/5                           5/5                   5/5  L                      5/5                 5/5                         5/5                           5/5                   5/5  Lowers      HF(L1/L2)     KE (L3)     DF (L4)     EHL (L5)     PF (S1)      R                     5/5              5/5           5/5           5/5            5/5  L                     5/5               5/5          5/5            5/5            5/5  SENSATION: grossly intact to light touch all extremities  COORDINATION: Gait intact; rapid alternating movements intact; heel to shin intact; no upper extremity dysmetria  CHEST/LUNG: Clear to auscultation bilaterally; no rales, rhonchi, wheezing, or rubs  HEART: +S1/+S2; Regular rate and rhythm; no murmurs, rubs, or gallops  ABDOMEN: Soft, nontender, nondistended; bowel sounds present all four quadrants  EXTREMITIES:  2+ peripheral pulses, no clubbing, cyanosis, or edema  SKIN: Warm, dry; no rashes or lesions    LABS:                        12.2   8.35  )-----------( 182      ( 27 Sep 2024 03:30 )             36.1     09-27    145  |  107  |  11.4  ----------------------------<  146[H]  3.3[L]   |  24.0  |  0.87    Ca    8.3[L]      27 Sep 2024 03:30  Phos  3.1     09-27  Mg     2.1     09-27      PT/INR - ( 26 Sep 2024 02:30 )   PT: 11.3 sec;   INR: 1.00 ratio         PTT - ( 26 Sep 2024 02:30 )  PTT:31.1 sec  Urinalysis Basic - ( 27 Sep 2024 03:30 )    Color: x / Appearance: x / SG: x / pH: x  Gluc: 146 mg/dL / Ketone: x  / Bili: x / Urobili: x   Blood: x / Protein: x / Nitrite: x   Leuk Esterase: x / RBC: x / WBC x   Sq Epi: x / Non Sq Epi: x / Bacteria: x        09-26 @ 07:01 - 09-27 @ 07:00  --------------------------------------------------------  IN: 2617.5 mL / OUT: 3425 mL / NET: -807.5 mL    09-27 @ 07:01 - 09-27 @ 16:11  --------------------------------------------------------  IN: 980 mL / OUT: 950 mL / NET: 30 mL        RADIOLOGY & ADDITIONAL TESTS: HPI:  59M with PMH kidney stones, possible TIA who presents today for b/l SDH. Per patient, he began having headaches about 5-6 days ago, came on gradually and now 8/10. He was taking intermittent tylenol and ibuprofen for pain which did help for some of the pain. He reports that the headache worsened today and was associated with one episode of nausea and vomiting. Due to symptoms, he presented to [facility] for workup. He underwent CTH which showed b/l chronic on subacute on acute SDH, 1cm each with no MLS. He was transferred to [facility] for neurosurgical evaluation. He admits to taking baby aspirin daily due to possible TIA, last took this am. He denies weakness, numbness, tingling, dizziness, difficulty walking. (25 Sep 2024 16:53)      INTERVAL HPI/OVERNIGHT EVENTS:  59M with PMH kidney stones, possible TIA who presents today for b/l SDH. Per patient, he began having headaches about 5-6 days ago, came on gradually and now 8/10. He was taking intermittent tylenol and ibuprofen for pain which did help for some of the pain. He reports that the headache worsened today and was associated with one episode of nausea and vomiting. Due to symptoms, he presented to [facility] for workup. He underwent CTH which showed b/l chronic on subacute on acute SDH, 1cm each with no MLS. He was transferred to [facility] for neurosurgical evaluation. He admits to taking baby aspirin daily due to possible TIA, last took this am. Patient underwent cerebral angiogram for MMA embo with Dr. Hsieh on 9/26 without complication. Patient monitored in [facility] post procedural. Neuro exam remained stable. CTH on 9/27 stable. Patient reports continued HA, MRI c/t/l spine completed to r/o CSF leak which was negative for CSF leak or cord compression. Of note patient had TTE which showed EF 66% and basal wall inferior segment abnormal. Patient without CP or QUESADA. Patient deemed stable for downgrade to Telemetry.     Vital Signs Last 24 Hrs  T(C): 37.1 (27 Sep 2024 16:01), Max: 37.4 (27 Sep 2024 00:00)  T(F): 98.8 (27 Sep 2024 16:01), Max: 99.3 (27 Sep 2024 00:00)  HR: 64 (27 Sep 2024 16:00) (59 - 82)  BP: 116/72 (27 Sep 2024 16:00) (97/58 - 158/100)  BP(mean): 85 (27 Sep 2024 16:00) (70 - 117)  RR: 14 (27 Sep 2024 16:00) (10 - 19)  SpO2: 99% (27 Sep 2024 16:00) (97% - 100%)    Parameters below as of 27 Sep 2024 16:00  Patient On (Oxygen Delivery Method): room air        PHYSICAL EXAM:  General: No Acute Distress     Neurological: Awake, alert & oriented to person, place and time, Following Commands, PERRL, EOMI, Visual fields intact, Face Symmetric, Speech Fluent, Moving all extremities     Muscle Strength: RUE: 5/5 LUE: 5/5 RLE: 5/5 LLE: 5/5  No Drift     Sensation Intact to Light Touch     Gait : deferred    Pulmonary: non labored breathing, no use of accessory muscles    Cardiovascular:  Regular Rate and Rhythm     Gastrointestinal: Soft, Nontender, Nondistended       LABS:                        12.2   8.35  )-----------( 182      ( 27 Sep 2024 03:30 )             36.1     09-27    145  |  107  |  11.4  ----------------------------<  146[H]  3.3[L]   |  24.0  |  0.87    Ca    8.3[L]      27 Sep 2024 03:30  Phos  3.1     09-27  Mg     2.1     09-27      PT/INR - ( 26 Sep 2024 02:30 )   PT: 11.3 sec;   INR: 1.00 ratio         PTT - ( 26 Sep 2024 02:30 )  PTT:31.1 sec  Urinalysis Basic - ( 27 Sep 2024 03:30 )    Color: x / Appearance: x / SG: x / pH: x  Gluc: 146 mg/dL / Ketone: x  / Bili: x / Urobili: x   Blood: x / Protein: x / Nitrite: x   Leuk Esterase: x / RBC: x / WBC x   Sq Epi: x / Non Sq Epi: x / Bacteria: x        09-26 @ 07:01  -  09-27 @ 07:00  --------------------------------------------------------  IN: 2617.5 mL / OUT: 3425 mL / NET: -807.5 mL    09-27 @ 07:01  -  09-27 @ 16:11  --------------------------------------------------------  IN: 980 mL / OUT: 950 mL / NET: 30 mL        RADIOLOGY & ADDITIONAL TESTS:  < from: CT Head No Cont (09.25.24 @ 14:28) >    MPRESSION:      1. Bilateral holohemispheric subdural hematomas which are multiloculated   and of mixed density, suggesting both chronic, subacute, and acute   components. Both collections measure approximately 1 cm without   significant midline shift. Gyral and sulcal effacement.  2. No intraparenchymal hemorrhage, or infarct.  3. Widely patent head and neck vasculature, as outlined above.  4. No acute cervical fracture identified. Underlying degenerative changes   most extensive at C5-C6. Upper thoracic degenerative changes also noted    < end of copied text >    < from: CT Angio Head w/ IV Cont (09.25.24 @ 14:29) >    IMPRESSION:      1. Bilateral holohemispheric subdural hematomas which are multiloculated   and of mixed density, suggesting both chronic, subacute, and acute   components. Both collections measure approximately 1 cm without   significant midline shift. Gyral and sulcal effacement.  2. No intraparenchymal hemorrhage, or infarct.  3. Widely patent head and neck vasculature, as outlined above.  4. No acute cervical fracture identified. Underlying degenerative changes   most extensive at C5-C6. Upper thoracic degenerative changes also noted.    < end of copied text >    < from: MR Head w/wo IV Cont (09.25.24 @ 22:44) >    IMPRESSION:  Mildly heterogeneous holohemispheric subdural measure up to approximately   9 mm in caliber bilaterally, stable by my measurements from the prior   head CT scan on 09/25/2024 at 2:12 PM.    There is diffuse mild dural thickening and enhancement bilaterally,   without compelling evidence for an underlying mass.    < end of copied text >    < from: CT Head No Cont (09.27.24 @ 06:06) >    IMPRESSION: Status post bilateral middle meningeal artery embolization   procedure.    No change in size or magnitude of subdural hemorrhage, as discussed.      < from: MR Cervical Spine No Cont (09.27.24 @ 13:08) >    FINDINGS:    CERVICAL SPINE MRI:  Severe disc desiccation at C5/C6. Mild retrolisthesis of C5 over C6.  Unremarkable T1 marrow signal. No cord signal abnormality. No evidence of   cord compression. No bone marrow edema. There isno evidence for acute   fracture. A normal lordosis is noted. Craniocervical junction is normal.   The cervicovertebral body heights and intervertebral disc spaces are   preserved. There is no prevertebral soft tissue abnormality. No evidence   of CSF leak.      Evaluation of the individual levels:  C2/C3 level: No spinal canal stenosis or foraminal narrowing.  C3/C4 level: No spinal canal stenosis or foraminal narrowing.  C4/C5 level: No spinal canal stenosis or foraminal narrowing.  C5/C6 level: Broad-based disc herniation. Mild flattening the ventral   aspect of the cord. Mild bilateral foraminal narrowing. No spinal canal   stenosis.  C6/C7 level: No spinal canal stenosis or foraminal narrowing.  C7/T1 level:  No spinal canal stenosis or foraminal narrowing. Small left   foraminal perineural root sleeve cyst. Tiny right foraminal perineural   root sleeve cyst.    THORACIC SPINE MRI:    Moderate multilevel discogenic degenerative changes. No evidence of cord   compression course abnormality. No soft tissue abnormality. No evidence   of CSF leak.    Moderate size hemangioma in the T9 vertebral body. Tiny bilateral T1/T2   foraminal perineural root sleeve cyst. Small left T4/T5 foraminal   perineural root sleeve cyst. Small right T6/T7 foraminal perineural root   sleeve cyst. Small right T7/T8 foraminal perineural root sleeve cyst.   Small right T8/T9 perineural root sleeve cyst. Moderate left T10/T11, 1.2   cm, perineural root sleeve cyst. Tiny left T11/T12 foraminal perineural   root sleeve cyst.      LUMBAR SPINE MRI:  The conus medullaris ends at the distal L1 level. Vertebral body heights   are maintained. Severe loss of disc space height at L5/S1. 6.3 mm   anterolisthesis of L5 over S1. No bone marrow edema. Mild nonspecific   diffuse posterior subcutaneous edema. Shallow broad-based disc herniation   at L4/L5 with a midline annular fissure. No spinal canal stenosis or   foraminal narrowing at any level. No evidence of a CSF leak.    IMPRESSION:    No evidence of a CSF leak. Multilevel thoracic spine foraminal perineural   sleeve cysts.    A/P:	  59M with PMH kidney stones, possible TIA who presents today for b/l SDH. Presented to [facility] for workup. He underwent CTH which showed b/l chronic on subacute on acute SDH, 1cm each with no MLS. He was transferred to [facility] for neurosurgical evaluation. Patient underwent cerebral angiogram for MMA embo with Dr. Hsieh on 9/26 without complication, now POD#1.     PLAN:  - d/w attending    Neuro:  - Neuro checks q 4 hours, vital checks q 4 hours  - CTH/MRB reviewed  - MRI c/t/l spine reviewed. No CSF leak  - Neuro IR considering myelography. Will monitor clinical course  - Pain control as needed (Ultram 25/50, tylenol). Can use Migraine cocktail (IV Mag 2g over 20min, IVF bolus 250-500cc, Reglan)   - VPA 250mg q6    Respiratory:  - Incentive spirometry  CV:  - Normotensive goals  - SBP goal < 160  Endocrine:  - Goal euglycemia  Heme/Onc:  - DVT ppx Heparin SQ 5000 Q8 hour, Lovenox 40 SQ  Renal:  - NS@ 100 ml/hour; IV lock when tolerating diet   ID:  - Afebrile   GI:  - Regular diet  Activity:  - Increase as tolerated  - PT pending  - OT pending  Other:  - PM&R eval  - Supportive care/further medical management as per primary care team HPI:  59M with PMH kidney stones, possible TIA who presents today for b/l SDH. Per patient, he began having headaches about 5-6 days ago, came on gradually and now 8/10. He was taking intermittent tylenol and ibuprofen for pain which did help for some of the pain. He reports that the headache worsened today and was associated with one episode of nausea and vomiting. Due to symptoms, he presented to [facility] for workup. He underwent CTH which showed b/l chronic on subacute on acute SDH, 1cm each with no MLS. He was transferred to [facility] for neurosurgical evaluation. He admits to taking baby aspirin daily due to possible TIA, last took this am. He denies weakness, numbness, tingling, dizziness, difficulty walking. (25 Sep 2024 16:53)      INTERVAL HPI/OVERNIGHT EVENTS:  59M with PMH kidney stones, possible TIA who presents today for b/l SDH. Per patient, he began having headaches about 5-6 days ago, came on gradually and now 8/10. He was taking intermittent tylenol and ibuprofen for pain which did help for some of the pain. He reports that the headache worsened today and was associated with one episode of nausea and vomiting. Due to symptoms, he presented to [facility] for workup. He underwent CTH which showed b/l chronic on subacute on acute SDH, 1cm each with no MLS. He was transferred to [facility] for neurosurgical evaluation. He admits to taking baby aspirin daily due to possible TIA, last took this am. Patient underwent cerebral angiogram for MMA embo with Dr. Hsieh on 9/26 without complication. Patient monitored in [facility] post procedural. Neuro exam remained stable. CTH on 9/27 stable. Patient reports continued HA, MRI c/t/l spine completed to r/o CSF leak which was negative for CSF leak or cord compression. Of note patient had TTE which showed EF 66% and basal wall inferior segment abnormal. Patient without CP or QUESADA. Patient deemed stable for downgrade to Telemetry.     Vital Signs Last 24 Hrs  T(C): 37.1 (27 Sep 2024 16:01), Max: 37.4 (27 Sep 2024 00:00)  T(F): 98.8 (27 Sep 2024 16:01), Max: 99.3 (27 Sep 2024 00:00)  HR: 64 (27 Sep 2024 16:00) (59 - 82)  BP: 116/72 (27 Sep 2024 16:00) (97/58 - 158/100)  BP(mean): 85 (27 Sep 2024 16:00) (70 - 117)  RR: 14 (27 Sep 2024 16:00) (10 - 19)  SpO2: 99% (27 Sep 2024 16:00) (97% - 100%)    Parameters below as of 27 Sep 2024 16:00  Patient On (Oxygen Delivery Method): room air        PHYSICAL EXAM:  General: No Acute Distress     Neurological: Awake, alert & oriented to person, place and time, Following Commands, PERRL, EOMI, Visual fields intact, Face Symmetric, Speech Fluent, Moving all extremities     Muscle Strength: RUE: 5/5 LUE: 5/5 RLE: 5/5 LLE: 5/5  No Drift     Sensation Intact to Light Touch     Gait : deferred    Pulmonary: non labored breathing, no use of accessory muscles    Cardiovascular:  Regular Rate and Rhythm     Gastrointestinal: Soft, Nontender, Nondistended       LABS:                        12.2   8.35  )-----------( 182      ( 27 Sep 2024 03:30 )             36.1     09-27    145  |  107  |  11.4  ----------------------------<  146[H]  3.3[L]   |  24.0  |  0.87    Ca    8.3[L]      27 Sep 2024 03:30  Phos  3.1     09-27  Mg     2.1     09-27      PT/INR - ( 26 Sep 2024 02:30 )   PT: 11.3 sec;   INR: 1.00 ratio         PTT - ( 26 Sep 2024 02:30 )  PTT:31.1 sec  Urinalysis Basic - ( 27 Sep 2024 03:30 )    Color: x / Appearance: x / SG: x / pH: x  Gluc: 146 mg/dL / Ketone: x  / Bili: x / Urobili: x   Blood: x / Protein: x / Nitrite: x   Leuk Esterase: x / RBC: x / WBC x   Sq Epi: x / Non Sq Epi: x / Bacteria: x        09-26 @ 07:01  -  09-27 @ 07:00  --------------------------------------------------------  IN: 2617.5 mL / OUT: 3425 mL / NET: -807.5 mL    09-27 @ 07:01  -  09-27 @ 16:11  --------------------------------------------------------  IN: 980 mL / OUT: 950 mL / NET: 30 mL        RADIOLOGY & ADDITIONAL TESTS:  < from: CT Head No Cont (09.25.24 @ 14:28) >    MPRESSION:      1. Bilateral holohemispheric subdural hematomas which are multiloculated   and of mixed density, suggesting both chronic, subacute, and acute   components. Both collections measure approximately 1 cm without   significant midline shift. Gyral and sulcal effacement.  2. No intraparenchymal hemorrhage, or infarct.  3. Widely patent head and neck vasculature, as outlined above.  4. No acute cervical fracture identified. Underlying degenerative changes   most extensive at C5-C6. Upper thoracic degenerative changes also noted    < end of copied text >    < from: CT Angio Head w/ IV Cont (09.25.24 @ 14:29) >    IMPRESSION:      1. Bilateral holohemispheric subdural hematomas which are multiloculated   and of mixed density, suggesting both chronic, subacute, and acute   components. Both collections measure approximately 1 cm without   significant midline shift. Gyral and sulcal effacement.  2. No intraparenchymal hemorrhage, or infarct.  3. Widely patent head and neck vasculature, as outlined above.  4. No acute cervical fracture identified. Underlying degenerative changes   most extensive at C5-C6. Upper thoracic degenerative changes also noted.    < end of copied text >    < from: MR Head w/wo IV Cont (09.25.24 @ 22:44) >    IMPRESSION:  Mildly heterogeneous holohemispheric subdural measure up to approximately   9 mm in caliber bilaterally, stable by my measurements from the prior   head CT scan on 09/25/2024 at 2:12 PM.    There is diffuse mild dural thickening and enhancement bilaterally,   without compelling evidence for an underlying mass.    < end of copied text >    < from: CT Head No Cont (09.27.24 @ 06:06) >    IMPRESSION: Status post bilateral middle meningeal artery embolization   procedure.    No change in size or magnitude of subdural hemorrhage, as discussed.      < from: MR Cervical Spine No Cont (09.27.24 @ 13:08) >    FINDINGS:    CERVICAL SPINE MRI:  Severe disc desiccation at C5/C6. Mild retrolisthesis of C5 over C6.  Unremarkable T1 marrow signal. No cord signal abnormality. No evidence of   cord compression. No bone marrow edema. There isno evidence for acute   fracture. A normal lordosis is noted. Craniocervical junction is normal.   The cervicovertebral body heights and intervertebral disc spaces are   preserved. There is no prevertebral soft tissue abnormality. No evidence   of CSF leak.      Evaluation of the individual levels:  C2/C3 level: No spinal canal stenosis or foraminal narrowing.  C3/C4 level: No spinal canal stenosis or foraminal narrowing.  C4/C5 level: No spinal canal stenosis or foraminal narrowing.  C5/C6 level: Broad-based disc herniation. Mild flattening the ventral   aspect of the cord. Mild bilateral foraminal narrowing. No spinal canal   stenosis.  C6/C7 level: No spinal canal stenosis or foraminal narrowing.  C7/T1 level:  No spinal canal stenosis or foraminal narrowing. Small left   foraminal perineural root sleeve cyst. Tiny right foraminal perineural   root sleeve cyst.    THORACIC SPINE MRI:    Moderate multilevel discogenic degenerative changes. No evidence of cord   compression course abnormality. No soft tissue abnormality. No evidence   of CSF leak.    Moderate size hemangioma in the T9 vertebral body. Tiny bilateral T1/T2   foraminal perineural root sleeve cyst. Small left T4/T5 foraminal   perineural root sleeve cyst. Small right T6/T7 foraminal perineural root   sleeve cyst. Small right T7/T8 foraminal perineural root sleeve cyst.   Small right T8/T9 perineural root sleeve cyst. Moderate left T10/T11, 1.2   cm, perineural root sleeve cyst. Tiny left T11/T12 foraminal perineural   root sleeve cyst.      LUMBAR SPINE MRI:  The conus medullaris ends at the distal L1 level. Vertebral body heights   are maintained. Severe loss of disc space height at L5/S1. 6.3 mm   anterolisthesis of L5 over S1. No bone marrow edema. Mild nonspecific   diffuse posterior subcutaneous edema. Shallow broad-based disc herniation   at L4/L5 with a midline annular fissure. No spinal canal stenosis or   foraminal narrowing at any level. No evidence of a CSF leak.    IMPRESSION:    No evidence of a CSF leak. Multilevel thoracic spine foraminal perineural   sleeve cysts.    A/P:	  59M with PMH kidney stones, possible TIA who presents today for b/l SDH. Presented to [facility] for workup. He underwent CTH which showed b/l chronic on subacute on acute SDH, 1cm each with no MLS. He was transferred to [facility] for neurosurgical evaluation. Patient underwent cerebral angiogram for MMA embo with Dr. Hsieh on 9/26 without complication, now POD#1.     PLAN:  - d/w attending    Neuro:  - Neuro checks q 4 hours, vital checks q 4 hours  - CTH/MRB reviewed  - MRI c/t/l spine reviewed. No CSF leak  - Neuro IR considering myelography. Will monitor clinical course  - Pain control as needed (Ultram 25/50, tylenol). Can use Migraine cocktail (IV Mag 2g over 20min, IVF bolus 250-500cc, Reglan)   - VPA 250mg q6    Respiratory:  - Incentive spirometry  - On RA    CV:  - Normotensive goals  - SBP goal < 160  - c/w atorvastatin 40mg   - TTE EF 66%. Basal inferior segment abnormal, but no CP or QUESADA  - Cardiac monitor     Endocrine:  - Goal euglycemia  - A1c 5.6   - TSH 0.19, repeat TSH in am     Heme/Onc:  - DVT ppx SCD  - Discuss timing of SQL with NeuroSx    Renal:  - TOV  - monitor BMP, supplement electrolytes PRN    ID:  - Afebrile     GI:  - Regular diet  - Bowel regiment senna, miralax     Activity:  - Increase as tolerated  - PT pending  - OT pending    DISPO: Downgrade to Telemetry under NeuroSx. Medicine consulted to co-manage

## 2024-09-27 NOTE — DIETITIAN INITIAL EVALUATION ADULT - ORAL INTAKE PTA/DIET HISTORY
Pt reports eating well PTA; denies any recent weight changes. Pt currently complains of severe headache and did not want to eat much at breakfast this morning.   Pt denies difficulty chewing or swallowing at this time. Increased PO intake encouraged.

## 2024-09-27 NOTE — DIETITIAN INITIAL EVALUATION ADULT - OTHER INFO
Pt is a 59 year old M with PMHx of kidney stones, possible TIA on ASA 81mg who presented to [facility] 9/25 c/o worsening headache with associated nausea and vomiting that started about 5-6 days ago found to have b/l acute on chronic SDH. Patient was transferred to [facility] for further neurosurgical evaluation.

## 2024-09-27 NOTE — DIETITIAN INITIAL EVALUATION ADULT - PERTINENT MEDS FT
MEDICATIONS  (STANDING):  atorvastatin 40 milliGRAM(s) Oral at bedtime  chlorhexidine 2% Cloths 1 Application(s) Topical daily  influenza   Vaccine 0.5 milliLiter(s) IntraMuscular once  polyethylene glycol 3350 17 Gram(s) Oral daily  senna 2 Tablet(s) Oral at bedtime  sodium chloride 0.9%. 1000 milliLiter(s) (75 mL/Hr) IV Continuous <Continuous>  valproate sodium   IVPB 250 milliGRAM(s) IV Intermittent every 6 hours    MEDICATIONS  (PRN):  ondansetron Injectable 4 milliGRAM(s) IV Push every 6 hours PRN Nausea and/or Vomiting  traMADol 50 milliGRAM(s) Oral every 6 hours PRN Severe Pain (7 - 10)

## 2024-09-27 NOTE — DIETITIAN INITIAL EVALUATION ADULT - PERTINENT LABORATORY DATA
09-27    145  |  107  |  11.4  ----------------------------<  146[H]  3.3[L]   |  24.0  |  0.87    Ca    8.3[L]      27 Sep 2024 03:30  Phos  3.1     09-27  Mg     2.1     09-27    TPro  7.5  /  Alb  4.2  /  TBili  0.9  /  DBili  x   /  AST  18  /  ALT  30  /  AlkPhos  56  09-25  A1C with Estimated Average Glucose Result: 5.6 % (09-27-24 @ 03:30)

## 2024-09-27 NOTE — PROGRESS NOTE ADULT - ASSESSMENT
59M who presented with 5-6 days of severe HA, progressing to N/V, found to have b/l SDH 1cm each without MLS. Patient underwent b/l MMA embolization on 9/26/24.   - POD 1  - Exam stable  - CTH this am stable   - MRI C/T/L spine show no CSF leak but multiple perineural root sleeve cysts      Plan:  - Discussed with Dr. Hsieh   - Q2 hour neuro checks  - SBP goal   - Monitor groin  - Pain control PRN, avoid oversedation  - Will plan for further workup for possible CSF leak as outpatient given patient's intact exam and headaches inconsistent with CSF leak  - Further care per primary team

## 2024-09-28 DIAGNOSIS — I25.10 ATHEROSCLEROTIC HEART DISEASE OF NATIVE CORONARY ARTERY WITHOUT ANGINA PECTORIS: ICD-10-CM

## 2024-09-28 LAB
ALBUMIN SERPL ELPH-MCNC: 3.8 G/DL — SIGNIFICANT CHANGE UP (ref 3.3–5.2)
ALP SERPL-CCNC: 57 U/L — SIGNIFICANT CHANGE UP (ref 40–120)
ALT FLD-CCNC: 14 U/L — SIGNIFICANT CHANGE UP
ANION GAP SERPL CALC-SCNC: 13 MMOL/L — SIGNIFICANT CHANGE UP (ref 5–17)
ANION GAP SERPL CALC-SCNC: 14 MMOL/L — SIGNIFICANT CHANGE UP (ref 5–17)
ANION GAP SERPL CALC-SCNC: 9 MMOL/L — SIGNIFICANT CHANGE UP (ref 5–17)
ANION GAP SERPL CALC-SCNC: 9 MMOL/L — SIGNIFICANT CHANGE UP (ref 5–17)
AST SERPL-CCNC: 20 U/L — SIGNIFICANT CHANGE UP
BILIRUB SERPL-MCNC: 0.6 MG/DL — SIGNIFICANT CHANGE UP (ref 0.4–2)
BUN SERPL-MCNC: 10.9 MG/DL — SIGNIFICANT CHANGE UP (ref 8–20)
BUN SERPL-MCNC: 11.1 MG/DL — SIGNIFICANT CHANGE UP (ref 8–20)
BUN SERPL-MCNC: 11.1 MG/DL — SIGNIFICANT CHANGE UP (ref 8–20)
BUN SERPL-MCNC: 7.3 MG/DL — LOW (ref 8–20)
CALCIUM SERPL-MCNC: 5.4 MG/DL — CRITICAL LOW (ref 8.4–10.5)
CALCIUM SERPL-MCNC: 8.3 MG/DL — LOW (ref 8.4–10.5)
CALCIUM SERPL-MCNC: 8.9 MG/DL — SIGNIFICANT CHANGE UP (ref 8.4–10.5)
CALCIUM SERPL-MCNC: 9 MG/DL — SIGNIFICANT CHANGE UP (ref 8.4–10.5)
CHLORIDE SERPL-SCNC: 103 MMOL/L — SIGNIFICANT CHANGE UP (ref 96–108)
CHLORIDE SERPL-SCNC: 104 MMOL/L — SIGNIFICANT CHANGE UP (ref 96–108)
CHLORIDE SERPL-SCNC: 105 MMOL/L — SIGNIFICANT CHANGE UP (ref 96–108)
CHLORIDE SERPL-SCNC: 114 MMOL/L — HIGH (ref 96–108)
CK MB CFR SERPL CALC: <1 NG/ML — SIGNIFICANT CHANGE UP (ref 0–6.7)
CK SERPL-CCNC: 170 U/L — SIGNIFICANT CHANGE UP (ref 30–200)
CO2 SERPL-SCNC: 18 MMOL/L — LOW (ref 22–29)
CO2 SERPL-SCNC: 22 MMOL/L — SIGNIFICANT CHANGE UP (ref 22–29)
CO2 SERPL-SCNC: 23 MMOL/L — SIGNIFICANT CHANGE UP (ref 22–29)
CO2 SERPL-SCNC: 27 MMOL/L — SIGNIFICANT CHANGE UP (ref 22–29)
CORTIS SERPL-MCNC: 20.6 UG/DL — SIGNIFICANT CHANGE UP
CREAT SERPL-MCNC: 0.38 MG/DL — LOW (ref 0.5–1.3)
CREAT SERPL-MCNC: 0.73 MG/DL — SIGNIFICANT CHANGE UP (ref 0.5–1.3)
CREAT SERPL-MCNC: 0.74 MG/DL — SIGNIFICANT CHANGE UP (ref 0.5–1.3)
CREAT SERPL-MCNC: 0.76 MG/DL — SIGNIFICANT CHANGE UP (ref 0.5–1.3)
EGFR: 102 ML/MIN/1.73M2 — SIGNIFICANT CHANGE UP
EGFR: 103 ML/MIN/1.73M2 — SIGNIFICANT CHANGE UP
EGFR: 104 ML/MIN/1.73M2 — SIGNIFICANT CHANGE UP
EGFR: 126 ML/MIN/1.73M2 — SIGNIFICANT CHANGE UP
GLUCOSE SERPL-MCNC: 101 MG/DL — HIGH (ref 70–99)
GLUCOSE SERPL-MCNC: 108 MG/DL — HIGH (ref 70–99)
GLUCOSE SERPL-MCNC: 109 MG/DL — HIGH (ref 70–99)
GLUCOSE SERPL-MCNC: 79 MG/DL — SIGNIFICANT CHANGE UP (ref 70–99)
HCT VFR BLD CALC: 35.2 % — LOW (ref 39–50)
HGB BLD-MCNC: 11.6 G/DL — LOW (ref 13–17)
MAGNESIUM SERPL-MCNC: 2.4 MG/DL — SIGNIFICANT CHANGE UP (ref 1.6–2.6)
MAGNESIUM SERPL-MCNC: 2.4 MG/DL — SIGNIFICANT CHANGE UP (ref 1.6–2.6)
MAGNESIUM SERPL-MCNC: 2.5 MG/DL — SIGNIFICANT CHANGE UP (ref 1.8–2.6)
MCHC RBC-ENTMCNC: 30.4 PG — SIGNIFICANT CHANGE UP (ref 27–34)
MCHC RBC-ENTMCNC: 33 GM/DL — SIGNIFICANT CHANGE UP (ref 32–36)
MCV RBC AUTO: 92.4 FL — SIGNIFICANT CHANGE UP (ref 80–100)
OSMOLALITY SERPL: 294 MOSMOL/KG — SIGNIFICANT CHANGE UP (ref 280–301)
OSMOLALITY UR: 279 MOSM/KG — LOW (ref 300–1000)
PHOSPHATE SERPL-MCNC: 2.5 MG/DL — SIGNIFICANT CHANGE UP (ref 2.4–4.7)
PHOSPHATE SERPL-MCNC: 3.3 MG/DL — SIGNIFICANT CHANGE UP (ref 2.4–4.7)
PLATELET # BLD AUTO: 156 K/UL — SIGNIFICANT CHANGE UP (ref 150–400)
POTASSIUM SERPL-MCNC: 2.6 MMOL/L — CRITICAL LOW (ref 3.5–5.3)
POTASSIUM SERPL-MCNC: 3.4 MMOL/L — LOW (ref 3.5–5.3)
POTASSIUM SERPL-MCNC: 4.1 MMOL/L — SIGNIFICANT CHANGE UP (ref 3.5–5.3)
POTASSIUM SERPL-MCNC: 4.2 MMOL/L — SIGNIFICANT CHANGE UP (ref 3.5–5.3)
POTASSIUM SERPL-SCNC: 2.6 MMOL/L — CRITICAL LOW (ref 3.5–5.3)
POTASSIUM SERPL-SCNC: 3.4 MMOL/L — LOW (ref 3.5–5.3)
POTASSIUM SERPL-SCNC: 4.1 MMOL/L — SIGNIFICANT CHANGE UP (ref 3.5–5.3)
POTASSIUM SERPL-SCNC: 4.2 MMOL/L — SIGNIFICANT CHANGE UP (ref 3.5–5.3)
PROT SERPL-MCNC: 6.4 G/DL — LOW (ref 6.6–8.7)
RBC # BLD: 3.81 M/UL — LOW (ref 4.2–5.8)
RBC # FLD: 14 % — SIGNIFICANT CHANGE UP (ref 10.3–14.5)
SODIUM SERPL-SCNC: 139 MMOL/L — SIGNIFICANT CHANGE UP (ref 135–145)
SODIUM SERPL-SCNC: 140 MMOL/L — SIGNIFICANT CHANGE UP (ref 135–145)
SODIUM SERPL-SCNC: 141 MMOL/L — SIGNIFICANT CHANGE UP (ref 135–145)
SODIUM SERPL-SCNC: 141 MMOL/L — SIGNIFICANT CHANGE UP (ref 135–145)
SODIUM UR-SCNC: 86 MMOL/L — SIGNIFICANT CHANGE UP
TROPONIN T, HIGH SENSITIVITY RESULT: <6 NG/L — SIGNIFICANT CHANGE UP (ref 0–51)
TSH SERPL-MCNC: 0.32 UIU/ML — SIGNIFICANT CHANGE UP (ref 0.27–4.2)
WBC # BLD: 6.29 K/UL — SIGNIFICANT CHANGE UP (ref 3.8–10.5)
WBC # FLD AUTO: 6.29 K/UL — SIGNIFICANT CHANGE UP (ref 3.8–10.5)

## 2024-09-28 PROCEDURE — 99223 1ST HOSP IP/OBS HIGH 75: CPT

## 2024-09-28 PROCEDURE — 95720 EEG PHY/QHP EA INCR W/VEEG: CPT

## 2024-09-28 PROCEDURE — 99232 SBSQ HOSP IP/OBS MODERATE 35: CPT

## 2024-09-28 PROCEDURE — 99291 CRITICAL CARE FIRST HOUR: CPT

## 2024-09-28 PROCEDURE — 70450 CT HEAD/BRAIN W/O DYE: CPT | Mod: 26

## 2024-09-28 PROCEDURE — 93010 ELECTROCARDIOGRAM REPORT: CPT

## 2024-09-28 RX ORDER — ACETAMINOPHEN 325 MG
1000 TABLET ORAL ONCE
Refills: 0 | Status: COMPLETED | OUTPATIENT
Start: 2024-09-28 | End: 2024-09-28

## 2024-09-28 RX ORDER — ENOXAPARIN SODIUM 150 MG/ML
40 INJECTION SUBCUTANEOUS EVERY 24 HOURS
Refills: 0 | Status: DISCONTINUED | OUTPATIENT
Start: 2024-09-28 | End: 2024-09-28

## 2024-09-28 RX ORDER — MAGNESIUM SULFATE 500 MG/ML
1 VIAL (ML) INJECTION ONCE
Refills: 0 | Status: COMPLETED | OUTPATIENT
Start: 2024-09-28 | End: 2024-09-28

## 2024-09-28 RX ORDER — SODIUM CHLORIDE 0.9 % (FLUSH) 0.9 %
1000 SYRINGE (ML) INJECTION
Refills: 0 | Status: DISCONTINUED | OUTPATIENT
Start: 2024-09-28 | End: 2024-09-28

## 2024-09-28 RX ORDER — SODIUM CHLORIDE 0.9 % (FLUSH) 0.9 %
1000 SYRINGE (ML) INJECTION
Refills: 0 | Status: DISCONTINUED | OUTPATIENT
Start: 2024-09-28 | End: 2024-10-01

## 2024-09-28 RX ORDER — OXYCODONE HYDROCHLORIDE 30 MG/1
10 TABLET, FILM COATED, EXTENDED RELEASE ORAL EVERY 6 HOURS
Refills: 0 | Status: DISCONTINUED | OUTPATIENT
Start: 2024-09-28 | End: 2024-10-02

## 2024-09-28 RX ORDER — BUTALB/ACETAMINOPHEN/CAFFEINE 50-325-40
2 TABLET ORAL EVERY 6 HOURS
Refills: 0 | Status: DISCONTINUED | OUTPATIENT
Start: 2024-09-28 | End: 2024-10-02

## 2024-09-28 RX ORDER — METOCLOPRAMIDE HCL 5 MG
10 TABLET ORAL EVERY 6 HOURS
Refills: 0 | Status: DISCONTINUED | OUTPATIENT
Start: 2024-09-28 | End: 2024-10-02

## 2024-09-28 RX ORDER — OXYCODONE HYDROCHLORIDE 30 MG/1
5 TABLET, FILM COATED, EXTENDED RELEASE ORAL EVERY 6 HOURS
Refills: 0 | Status: DISCONTINUED | OUTPATIENT
Start: 2024-09-28 | End: 2024-10-02

## 2024-09-28 RX ADMIN — OXYCODONE HYDROCHLORIDE 5 MILLIGRAM(S): 30 TABLET, FILM COATED, EXTENDED RELEASE ORAL at 02:21

## 2024-09-28 RX ADMIN — Medication 400 MILLIGRAM(S): at 08:07

## 2024-09-28 RX ADMIN — Medication 50 MILLILITER(S): at 22:51

## 2024-09-28 RX ADMIN — VALPROIC ACID 52.5 MILLIGRAM(S): 250 CAPSULE ORAL at 12:30

## 2024-09-28 RX ADMIN — VALPROIC ACID 52.5 MILLIGRAM(S): 250 CAPSULE ORAL at 06:46

## 2024-09-28 RX ADMIN — Medication 1000 MILLIGRAM(S): at 08:22

## 2024-09-28 RX ADMIN — OXYCODONE HYDROCHLORIDE 5 MILLIGRAM(S): 30 TABLET, FILM COATED, EXTENDED RELEASE ORAL at 03:00

## 2024-09-28 RX ADMIN — Medication 200 GRAM(S): at 17:04

## 2024-09-28 RX ADMIN — Medication 50 MILLIEQUIVALENT(S): at 21:59

## 2024-09-28 RX ADMIN — Medication 20 MILLIEQUIVALENT(S): at 09:36

## 2024-09-28 RX ADMIN — OXYCODONE HYDROCHLORIDE 10 MILLIGRAM(S): 30 TABLET, FILM COATED, EXTENDED RELEASE ORAL at 09:06

## 2024-09-28 RX ADMIN — VALPROIC ACID 52.5 MILLIGRAM(S): 250 CAPSULE ORAL at 00:11

## 2024-09-28 RX ADMIN — Medication 50 MILLIEQUIVALENT(S): at 20:50

## 2024-09-28 RX ADMIN — Medication 40 MILLIEQUIVALENT(S): at 09:36

## 2024-09-28 RX ADMIN — Medication 40 MILLIEQUIVALENT(S): at 17:05

## 2024-09-28 RX ADMIN — Medication 100 MILLIEQUIVALENT(S): at 17:05

## 2024-09-28 RX ADMIN — VALPROIC ACID 52.5 MILLIGRAM(S): 250 CAPSULE ORAL at 17:55

## 2024-09-28 RX ADMIN — Medication 100 MILLILITER(S): at 20:50

## 2024-09-28 RX ADMIN — OXYCODONE HYDROCHLORIDE 10 MILLIGRAM(S): 30 TABLET, FILM COATED, EXTENDED RELEASE ORAL at 08:06

## 2024-09-28 RX ADMIN — Medication 100 GRAM(S): at 21:38

## 2024-09-28 RX ADMIN — CHLORHEXIDINE GLUCONATE ORAL RINSE 1 APPLICATION(S): 1.2 SOLUTION DENTAL at 12:32

## 2024-09-28 RX ADMIN — Medication 4 MILLIGRAM(S): at 12:31

## 2024-09-28 NOTE — CONSULT NOTE ADULT - SUBJECTIVE AND OBJECTIVE BOX
[facility]                                     Ne[facility] [place]                                 Liam Davila, & Zeyad                                  [address]                                        [place], [place], [address]                                             [phone]    CC:   SDH, s/p MMA embolization and headache  HPI:  The patient is a 59y Male who presented with headaches for about a week, was found to have b/l SDH.  He had MMA embolization on 9/26.  He has continues b/l temporal headaches with fluctuating mental status.  Family says he is more sleepy and is having memory issues at times.  Neurology is asked to evaluate    PAST MEDICAL & SURGICAL HISTORY:  Brain TIA      Kidney stones      No significant past surgical history          MEDICATIONS  (STANDING):  atorvastatin 40 milliGRAM(s) Oral at bedtime  chlorhexidine 2% Cloths 1 Application(s) Topical daily  influenza   Vaccine 0.5 milliLiter(s) IntraMuscular once  polyethylene glycol 3350 17 Gram(s) Oral daily  senna 2 Tablet(s) Oral at bedtime  sodium chloride 0.9%. 1000 milliLiter(s) (125 mL/Hr) IV Continuous <Continuous>  valproate sodium   IVPB 250 milliGRAM(s) IV Intermittent every 6 hours    MEDICATIONS  (PRN):  acetaminophen 325 mG/butalbital 50 mG/caffeine 40 mG 2 Tablet(s) Oral every 6 hours PRN severe headache or migraine  metoclopramide Injectable 10 milliGRAM(s) IV Push every 6 hours PRN nausea/vomiting  ondansetron Injectable 4 milliGRAM(s) IV Push every 6 hours PRN Nausea and/or Vomiting  oxyCODONE    IR 5 milliGRAM(s) Oral every 6 hours PRN Moderate Pain (4 - 6)  oxyCODONE    IR 10 milliGRAM(s) Oral every 6 hours PRN Severe Pain (7 - 10)      Allergies    No Known Allergies    Intolerances        SOCIAL HISTORY:  no tob,   no alcohol   no drugs    FAMILY HISTORY:  non contributory      ROS: 14 point ROS negative other than what is present in HPI or below    Vital Signs Last 24 Hrs  T(C): 36.9 (28 Sep 2024 12:00), Max: 37.1 (27 Sep 2024 16:01)  T(F): 98.4 (28 Sep 2024 12:00), Max: 98.8 (27 Sep 2024 16:01)  HR: 59 (28 Sep 2024 14:00) (53 - 73)  BP: 144/88 (28 Sep 2024 14:00) (104/76 - 148/86)  BP(mean): 106 (28 Sep 2024 14:00) (81 - 106)  RR: 12 (28 Sep 2024 14:00) (10 - 19)  SpO2: 98% (28 Sep 2024 14:00) (96% - 100%)    Parameters below as of 28 Sep 2024 12:00  Patient On (Oxygen Delivery Method): room air    General: NAD    Detailed Neurologic Exam:    Mental status: The patient is sleepy, easily reoriented but fades out at times.  The patient is fully oriented in 3 spheres. The patient is oriented to current events. The patient is able to name objects, follow commands, repeat sentences.    Cranial nerves: Pupils equal and react symmetrically to light. There is no visual field deficit to confrontation. Extraocular motion is full with no nystagmus. There is no ptosis. Facial sensation is intact. Facial musculature is symmetric. Palate elevates symmetrically. Tongue is midline.    Motor: There is normal bulk and tone.  There is no tremor.  Strength is 5/5 in the right arm and leg.   Strength is 5/5 in the left arm and leg.    Sensation: Intact to light touch and pin in 4 extremities    Cerebellar: There is no dysmetria on finger to nose testing.    Gait : deferred    LABS:                         11.6   6.29  )-----------( 156      ( 28 Sep 2024 05:20 )             35.2       09-28    141  |  105  |  11.1  ----------------------------<  101[H]  3.4[L]   |  27.0  |  0.73    Ca    8.3[L]      28 Sep 2024 05:20  Phos  2.5     09-28  Mg     2.5     09-28        RADIOLOGY & ADDITIONAL STUDIES (independently reviewed unless otherwise noted):  CT Head No Cont (09.28.24 @ 13:43)   IMPRESSION:  The patient is status post middle meningeal artery embolization   bilaterally.  Holohemispheric subdural hemorrhages measuring up to approximately 1.2 cm   in caliber bilaterally and trace subdural hemorrhage along the cerebral   falx are stable.  No evidence of interval rebleeding.  Mass effect in both cerebral convexities with approximately 2 mm midline   shift to the right is stable.  No new intracranial findings.

## 2024-09-28 NOTE — PROGRESS NOTE ADULT - SUBJECTIVE AND OBJECTIVE BOX
NSx PA Note  9/26/24 b/l MMA embo    HPI  59M with PMH kidney stones, possible TIA who presents today for b/l SDH. Per patient, he began having headaches about 5-6 days ago, came on gradually and now 8/10. He was taking intermittent tylenol and ibuprofen for pain which did help for some of the pain. He reports that the headache worsened today and was associated with one episode of nausea and vomiting. Due to symptoms, he presented to [facility] for [facility]up. He underwent CTH which showed b/l chronic on subacute on acute SDH, 1cm each with no MLS. He was transferred to [facility] for neurosurgical evaluation. He admits to taking baby aspirin daily due to possible TIA, last took this am. He denies weakness, numbness, tingling, dizziness, difficulty walking.  (25 Sep 2024 16:53)    Interval/Overnight Events   Patient seen bedside. Downgraded from NSICU yesterday. Per overnight PA, patient with continued headache pain, tramadol not helping, switched to oxy, but oxy not helping either. Headache profile has not been consistent with CSF-like headaches. Hospitalist called this morning concerned about ECHO results and that patient has severe CAD/needs stents. Asked to place Cardiology consult- completed. LBM 9/26.     MEDICATIONS  (STANDING):  atorvastatin 40 milliGRAM(s) Oral at bedtime  chlorhexidine 2% Cloths 1 Application(s) Topical daily  influenza   Vaccine 0.5 milliLiter(s) IntraMuscular once  polyethylene glycol 3350 17 Gram(s) Oral daily  potassium chloride    Tablet ER 20 milliEquivalent(s) Oral once  potassium chloride   Powder 40 milliEquivalent(s) Oral once  senna 2 Tablet(s) Oral at bedtime  valproate sodium   IVPB 250 milliGRAM(s) IV Intermittent every 6 hours    MEDICATIONS  (PRN):  ondansetron Injectable 4 milliGRAM(s) IV Push every 6 hours PRN Nausea and/or Vomiting  oxyCODONE    IR 5 milliGRAM(s) Oral every 6 hours PRN Moderate Pain (4 - 6)  oxyCODONE    IR 10 milliGRAM(s) Oral every 6 hours PRN Severe Pain (7 - 10)    Vital Signs Last 24 Hrs  T(C): 36.8 (28 Sep 2024 08:00), Max: 37.1 (27 Sep 2024 16:01)  T(F): 98.3 (28 Sep 2024 08:00), Max: 98.8 (27 Sep 2024 16:01)  HR: 61 (28 Sep 2024 07:00) (53 - 73)  BP: 127/80 (28 Sep 2024 07:00) (104/76 - 134/90)  BP(mean): 94 (28 Sep 2024 07:00) (81 - 104)  RR: 15 (28 Sep 2024 07:00) (10 - 19)  SpO2: 100% (28 Sep 2024 07:00) (96% - 100%)    Parameters below as of 28 Sep 2024 04:00  Patient On (Oxygen Delivery Method): room air    Neuro-                             11.6   6.29  )-----------( 156      ( 28 Sep 2024 05:20 )             35.2   09-28    141  |  105  |  11.1  ----------------------------<  101[H]  3.4[L]   |  27.0  |  0.73    Ca    8.3[L]      28 Sep 2024 05:20  Phos  2.5     09-28  Mg     2.5     09-28    ACC: [id number] EXAM:  MR SPINE LUMBAR   ORDERED BY: WILMA RAHMAN     ACC: [id number] EXAM:  MR SPINE THORACIC   ORDERED BY: WILMA RAHMAN     ACC: [id number] EXAM:  MR SPINE CERVICAL   ORDERED BY: WILMA RAHMAN     PROCEDURE DATE:  09/27/2024          INTERPRETATION:  CLINICAL HISTORY: b/l SDH r/o CSF leak    COMPARISON: None.    TECHNIQUE: Cervical spine, thoracic, and lumbar MRI: Multiplanar,   multisequence MR images of the total spine are obtained without   administration of intravenous gadolinium.    FINDINGS:    CERVICAL SPINE MRI:  Severe disc desiccation at C5/C6. Mild retrolisthesis of C5 over C6.  Unremarkable T1 marrow signal. No cord signal abnormality. No evidence of   cord compression. No bone marrow edema. There isno evidence for acute   fracture. A normal lordosis is noted. Craniocervical junction is normal.   The cervicovertebral body heights and intervertebral disc spaces are   preserved. There is no prevertebral soft tissue abnormality. No evidence   of CSF leak.      Evaluation of the individual levels:  C2/C3 level: No spinal canal stenosis or foraminal narrowing.  C3/C4 level: No spinal canal stenosis or foraminal narrowing.  C4/C5 level: No spinal canal stenosis or foraminal narrowing.  C5/C6 level: Broad-based disc herniation. Mild flattening the ventral   aspect of the cord. Mild bilateral foraminal narrowing. No spinal canal   stenosis.  C6/C7 level: No spinal canal stenosis or foraminal narrowing.  C7/T1 level:  No spinal canal stenosis or foraminal narrowing. Small left   foraminal perineural root sleeve cyst. Tiny right foraminal perineural   root sleeve cyst.    THORACIC SPINE MRI:    Moderate multilevel discogenic degenerative changes. No evidence of cord   compression course abnormality. No soft tissue abnormality. No evidence   of CSF leak.    Moderate size hemangioma in the T9 vertebral body. Tiny bilateral T1/T2   foraminal perineural root sleeve cyst. Small left T4/T5 foraminal   perineural root sleeve cyst. Small right T6/T7 foraminal perineural root   sleeve cyst. Small right T7/T8 foraminal perineural root sleeve cyst.   Small right T8/T9 perineural root sleeve cyst. Moderate left T10/T11, 1.2   cm, perineural root sleeve cyst. Tiny left T11/T12 foraminal perineural   root sleeve cyst.      LUMBAR SPINE MRI:  The conus medullaris ends at the distal L1 level. Vertebral body heights   are maintained. Severe loss of disc space height at L5/S1. 6.3 mm   anterolisthesis of L5 over S1. No bone marrow edema. Mild nonspecific   diffuse posterior subcutaneous edema. Shallow broad-based disc herniation   at L4/L5 with a midline annular fissure. No spinal canal stenosis or   foraminal narrowing at any level. No evidence of a CSF leak.    IMPRESSION:    No evidence of a CSF leak. Multilevel thoracic spine foraminal perineural   sleeve cysts.    --- End of Report ---    KAILEE CHRISTIE MD; Attending Radiologist  This document has been electronically signed. Sep 27 2024  3:01PM       NSx PA Note  9/26/24 b/l MMA embo    HPI  59M with PMH kidney stones, possible TIA who presents today for b/l SDH. Per patient, he began having headaches about 5-6 days ago, came on gradually and now 8/10. He was taking intermittent tylenol and ibuprofen for pain which did help for some of the pain. He reports that the headache worsened today and was associated with one episode of nausea and vomiting. Due to symptoms, he presented to [facility] for workup. He underwent CTH which showed b/l chronic on subacute on acute SDH, 1cm each with no MLS. He was transferred to [facility] for neurosurgical evaluation. He admits to taking baby aspirin daily due to possible TIA, last took this am. He denies weakness, numbness, tingling, dizziness, difficulty walking.  (25 Sep 2024 16:53)    Interval/Overnight Events   Patient seen bedside. Downgraded from NSICU yesterday. Per overnight PA, patient with continued headache pain, tramadol not helping, switched to oxy, but oxy not helping either. Headache profile has not been consistent with CSF-like headaches. Hospitalist called this morning concerned about ECHO results and that patient has severe CAD/needs stents. Asked to place Cardiology consult- completed. LBM 9/26.     MEDICATIONS  (STANDING):  atorvastatin 40 milliGRAM(s) Oral at bedtime  chlorhexidine 2% Cloths 1 Application(s) Topical daily  influenza   Vaccine 0.5 milliLiter(s) IntraMuscular once  polyethylene glycol 3350 17 Gram(s) Oral daily  potassium chloride    Tablet ER 20 milliEquivalent(s) Oral once  potassium chloride   Powder 40 milliEquivalent(s) Oral once  senna 2 Tablet(s) Oral at bedtime  valproate sodium   IVPB 250 milliGRAM(s) IV Intermittent every 6 hours    MEDICATIONS  (PRN):  ondansetron Injectable 4 milliGRAM(s) IV Push every 6 hours PRN Nausea and/or Vomiting  oxyCODONE    IR 5 milliGRAM(s) Oral every 6 hours PRN Moderate Pain (4 - 6)  oxyCODONE    IR 10 milliGRAM(s) Oral every 6 hours PRN Severe Pain (7 - 10)    Vital Signs Last 24 Hrs  T(C): 36.8 (28 Sep 2024 08:00), Max: 37.1 (27 Sep 2024 16:01)  T(F): 98.3 (28 Sep 2024 08:00), Max: 98.8 (27 Sep 2024 16:01)  HR: 61 (28 Sep 2024 07:00) (53 - 73)  BP: 127/80 (28 Sep 2024 07:00) (104/76 - 134/90)  BP(mean): 94 (28 Sep 2024 07:00) (81 - 104)  RR: 15 (28 Sep 2024 07:00) (10 - 19)  SpO2: 100% (28 Sep 2024 07:00) (96% - 100%)    Parameters below as of 28 Sep 2024 04:00  Patient On (Oxygen Delivery Method): room air    Neuro-                             11.6   6.29  )-----------( 156      ( 28 Sep 2024 05:20 )             35.2   09-28    141  |  105  |  11.1  ----------------------------<  101[H]  3.4[L]   |  27.0  |  0.73    Ca    8.3[L]      28 Sep 2024 05:20  Phos  2.5     09-28  Mg     2.5     09-28    ACC: [id number] EXAM:  MR SPINE LUMBAR   ORDERED BY: WILMA RAHMAN     ACC: [id number] EXAM:  MR SPINE THORACIC   ORDERED BY: WILMA RAHMAN     ACC: [id number] EXAM:  MR SPINE CERVICAL   ORDERED BY: WILMA RAHMAN     PROCEDURE DATE:  09/27/2024          INTERPRETATION:  CLINICAL HISTORY: b/l SDH r/o CSF leak    COMPARISON: None.    TECHNIQUE: Cervical spine, thoracic, and lumbar MRI: Multiplanar,   multisequence MR images of the total spine are obtained without   administration of intravenous gadolinium.    FINDINGS:    CERVICAL SPINE MRI:  Severe disc desiccation at C5/C6. Mild retrolisthesis of C5 over C6.  Unremarkable T1 marrow signal. No cord signal abnormality. No evidence of   cord compression. No bone marrow edema. There isno evidence for acute   fracture. A normal lordosis is noted. Craniocervical junction is normal.   The cervicovertebral body heights and intervertebral disc spaces are   preserved. There is no prevertebral soft tissue abnormality. No evidence   of CSF leak.      Evaluation of the individual levels:  C2/C3 level: No spinal canal stenosis or foraminal narrowing.  C3/C4 level: No spinal canal stenosis or foraminal narrowing.  C4/C5 level: No spinal canal stenosis or foraminal narrowing.  C5/C6 level: Broad-based disc herniation. Mild flattening the ventral   aspect of the cord. Mild bilateral foraminal narrowing. No spinal canal   stenosis.  C6/C7 level: No spinal canal stenosis or foraminal narrowing.  C7/T1 level:  No spinal canal stenosis or foraminal narrowing. Small left   foraminal perineural root sleeve cyst. Tiny right foraminal perineural   root sleeve cyst.    THORACIC SPINE MRI:    Moderate multilevel discogenic degenerative changes. No evidence of cord   compression course abnormality. No soft tissue abnormality. No evidence   of CSF leak.    Moderate size hemangioma in the T9 vertebral body. Tiny bilateral T1/T2   foraminal perineural root sleeve cyst. Small left T4/T5 foraminal   perineural root sleeve cyst. Small right T6/T7 foraminal perineural root   sleeve cyst. Small right T7/T8 foraminal perineural root sleeve cyst.   Small right T8/T9 perineural root sleeve cyst. Moderate left T10/T11, 1.2   cm, perineural root sleeve cyst. Tiny left T11/T12 foraminal perineural   root sleeve cyst.      LUMBAR SPINE MRI:  The conus medullaris ends at the distal L1 level. Vertebral body heights   are maintained. Severe loss of disc space height at L5/S1. 6.3 mm   anterolisthesis of L5 over S1. No bone marrow edema. Mild nonspecific   diffuse posterior subcutaneous edema. Shallow broad-based disc herniation   at L4/L5 with a midline annular fissure. No spinal canal stenosis or   foraminal narrowing at any level. No evidence of a CSF leak.    IMPRESSION:    No evidence of a CSF leak. Multilevel thoracic spine foraminal perineural   sleeve cysts.    --- End of Report ---    KAILEE CHRISTIE MD; Attending Radiologist  This document has been electronically signed. Sep 27 2024  3:01PM    Plan:        NSx PA Note  9/26/24 b/l MMA embo    HPI  59M with PMH kidney stones, possible TIA who presents today for b/l SDH. Per patient, he began having headaches about 5-6 days ago, came on gradually and now 8/10. He was taking intermittent tylenol and ibuprofen for pain which did help for some of the pain. He reports that the headache worsened today and was associated with one episode of nausea and vomiting. Due to symptoms, he presented to [facility] for workup. He underwent CTH which showed b/l chronic on subacute on acute SDH, 1cm each with no MLS. He was transferred to [facility] for neurosurgical evaluation. He admits to taking baby aspirin daily due to possible TIA, last took this am. He denies weakness, numbness, tingling, dizziness, difficulty walking.  (25 Sep 2024 16:53)    Interval/Overnight Events   Patient seen bedside. Downgraded from NSICU yesterday. Per overnight PA, patient with continued headache pain, tramadol not helping, switched to oxy, but oxy not helping either. Headache profile has not been consistent with CSF-like headaches. Hospitalist called this morning concerned about ECHO results and that patient has severe CAD/needs stents. Asked to place Cardiology consult- completed. LBM 9/26.     MEDICATIONS  (STANDING):  atorvastatin 40 milliGRAM(s) Oral at bedtime  chlorhexidine 2% Cloths 1 Application(s) Topical daily  influenza   Vaccine 0.5 milliLiter(s) IntraMuscular once  polyethylene glycol 3350 17 Gram(s) Oral daily  potassium chloride    Tablet ER 20 milliEquivalent(s) Oral once  potassium chloride   Powder 40 milliEquivalent(s) Oral once  senna 2 Tablet(s) Oral at bedtime  valproate sodium   IVPB 250 milliGRAM(s) IV Intermittent every 6 hours    MEDICATIONS  (PRN):  ondansetron Injectable 4 milliGRAM(s) IV Push every 6 hours PRN Nausea and/or Vomiting  oxyCODONE    IR 5 milliGRAM(s) Oral every 6 hours PRN Moderate Pain (4 - 6)  oxyCODONE    IR 10 milliGRAM(s) Oral every 6 hours PRN Severe Pain (7 - 10)    Vital Signs Last 24 Hrs  T(C): 36.8 (28 Sep 2024 08:00), Max: 37.1 (27 Sep 2024 16:01)  T(F): 98.3 (28 Sep 2024 08:00), Max: 98.8 (27 Sep 2024 16:01)  HR: 61 (28 Sep 2024 07:00) (53 - 73)  BP: 127/80 (28 Sep 2024 07:00) (104/76 - 134/90)  BP(mean): 94 (28 Sep 2024 07:00) (81 - 104)  RR: 15 (28 Sep 2024 07:00) (10 - 19)  SpO2: 100% (28 Sep 2024 07:00) (96% - 100%)    Parameters below as of 28 Sep 2024 04:00  Patient On (Oxygen Delivery Method): room air    Neuro-                             11.6   6.29  )-----------( 156      ( 28 Sep 2024 05:20 )             35.2   09-28    141  |  105  |  11.1  ----------------------------<  101[H]  3.4[L]   |  27.0  |  0.73    Ca    8.3[L]      28 Sep 2024 05:20  Phos  2.5     09-28  Mg     2.5     09-28    ACC: [id number] EXAM:  MR SPINE LUMBAR   ORDERED BY: WILMA RAHMAN     ACC: [id number] EXAM:  MR SPINE THORACIC   ORDERED BY: WILMA RAHMAN     ACC: [id number] EXAM:  MR SPINE CERVICAL   ORDERED BY: WILMA RAHMAN     PROCEDURE DATE:  09/27/2024          INTERPRETATION:  CLINICAL HISTORY: b/l SDH r/o CSF leak    COMPARISON: None.    TECHNIQUE: Cervical spine, thoracic, and lumbar MRI: Multiplanar,   multisequence MR images of the total spine are obtained without   administration of intravenous gadolinium.    FINDINGS:    CERVICAL SPINE MRI:  Severe disc desiccation at C5/C6. Mild retrolisthesis of C5 over C6.  Unremarkable T1 marrow signal. No cord signal abnormality. No evidence of   cord compression. No bone marrow edema. There isno evidence for acute   fracture. A normal lordosis is noted. Craniocervical junction is normal.   The cervicovertebral body heights and intervertebral disc spaces are   preserved. There is no prevertebral soft tissue abnormality. No evidence   of CSF leak.      Evaluation of the individual levels:  C2/C3 level: No spinal canal stenosis or foraminal narrowing.  C3/C4 level: No spinal canal stenosis or foraminal narrowing.  C4/C5 level: No spinal canal stenosis or foraminal narrowing.  C5/C6 level: Broad-based disc herniation. Mild flattening the ventral   aspect of the cord. Mild bilateral foraminal narrowing. No spinal canal   stenosis.  C6/C7 level: No spinal canal stenosis or foraminal narrowing.  C7/T1 level:  No spinal canal stenosis or foraminal narrowing. Small left   foraminal perineural root sleeve cyst. Tiny right foraminal perineural   root sleeve cyst.    THORACIC SPINE MRI:    Moderate multilevel discogenic degenerative changes. No evidence of cord   compression course abnormality. No soft tissue abnormality. No evidence   of CSF leak.    Moderate size hemangioma in the T9 vertebral body. Tiny bilateral T1/T2   foraminal perineural root sleeve cyst. Small left T4/T5 foraminal   perineural root sleeve cyst. Small right T6/T7 foraminal perineural root   sleeve cyst. Small right T7/T8 foraminal perineural root sleeve cyst.   Small right T8/T9 perineural root sleeve cyst. Moderate left T10/T11, 1.2   cm, perineural root sleeve cyst. Tiny left T11/T12 foraminal perineural   root sleeve cyst.      LUMBAR SPINE MRI:  The conus medullaris ends at the distal L1 level. Vertebral body heights   are maintained. Severe loss of disc space height at L5/S1. 6.3 mm   anterolisthesis of L5 over S1. No bone marrow edema. Mild nonspecific   diffuse posterior subcutaneous edema. Shallow broad-based disc herniation   at L4/L5 with a midline annular fissure. No spinal canal stenosis or   foraminal narrowing at any level. No evidence of a CSF leak.    IMPRESSION:    No evidence of a CSF leak. Multilevel thoracic spine foraminal perineural   sleeve cysts.    --- End of Report ---    KAILEE CHRISTIE MD; Attending Radiologist  This document has been electronically signed. Sep 27 2024  3:01PM    Plan:   NS stable  Monitor HA profile, ?trial of Fioricet  Replete K, re-check at 3pm, morning labs   Cardio consult called for echo abnormality and medicine concerns for CAD/stenting needs  Check cortisol/acth as possible source for headaches   Neuro checks q4/vitals q4  SBP less than 160  SCDs in bed, chemo ppx held   CW holding ASA  Bowel Regimen  Incentive monalisa   Dr. Arvizu to see.    NSx PA Note  9/26/24 b/l MMA embo    HPI  59M with PMH kidney stones, possible TIA who presents today for b/l SDH. Per patient, he began having headaches about 5-6 days ago, came on gradually and now 8/10. He was taking intermittent tylenol and ibuprofen for pain which did help for some of the pain. He reports that the headache worsened today and was associated with one episode of nausea and vomiting. Due to symptoms, he presented to [facility] for workup. He underwent CTH which showed b/l chronic on subacute on acute SDH, 1cm each with no MLS. He was transferred to [facility] for neurosurgical evaluation. He admits to taking baby aspirin daily due to possible TIA, last took this am. He denies weakness, numbness, tingling, dizziness, difficulty walking.  (25 Sep 2024 16:53)    Interval/Overnight Events   Patient seen bedside. Downgraded from NSICU yesterday. Per overnight PA, patient with continued headache pain, tramadol not helping, switched to oxy, but oxy not helping either. Headache profile has not been consistent with CSF-like headaches. Hospitalist called this morning concerned about ECHO results and that patient has severe CAD/needs stents. Asked to place Cardiology consult- completed. LBM 9/26.     MEDICATIONS  (STANDING):  atorvastatin 40 milliGRAM(s) Oral at bedtime  chlorhexidine 2% Cloths 1 Application(s) Topical daily  influenza   Vaccine 0.5 milliLiter(s) IntraMuscular once  polyethylene glycol 3350 17 Gram(s) Oral daily  potassium chloride    Tablet ER 20 milliEquivalent(s) Oral once  potassium chloride   Powder 40 milliEquivalent(s) Oral once  senna 2 Tablet(s) Oral at bedtime  valproate sodium   IVPB 250 milliGRAM(s) IV Intermittent every 6 hours    MEDICATIONS  (PRN):  ondansetron Injectable 4 milliGRAM(s) IV Push every 6 hours PRN Nausea and/or Vomiting  oxyCODONE    IR 5 milliGRAM(s) Oral every 6 hours PRN Moderate Pain (4 - 6)  oxyCODONE    IR 10 milliGRAM(s) Oral every 6 hours PRN Severe Pain (7 - 10)    Vital Signs Last 24 Hrs  T(C): 36.8 (28 Sep 2024 08:00), Max: 37.1 (27 Sep 2024 16:01)  T(F): 98.3 (28 Sep 2024 08:00), Max: 98.8 (27 Sep 2024 16:01)  HR: 61 (28 Sep 2024 07:00) (53 - 73)  BP: 127/80 (28 Sep 2024 07:00) (104/76 - 134/90)  BP(mean): 94 (28 Sep 2024 07:00) (81 - 104)  RR: 15 (28 Sep 2024 07:00) (10 - 19)  SpO2: 100% (28 Sep 2024 07:00) (96% - 100%)    Parameters below as of 28 Sep 2024 04:00  Patient On (Oxygen Delivery Method): room air    Neuro-                             11.6   6.29  )-----------( 156      ( 28 Sep 2024 05:20 )             35.2   09-28    141  |  105  |  11.1  ----------------------------<  101[H]  3.4[L]   |  27.0  |  0.73    Ca    8.3[L]      28 Sep 2024 05:20  Phos  2.5     09-28  Mg     2.5     09-28    ACC: [id number] EXAM:  MR SPINE LUMBAR   ORDERED BY: WILMA RAHMAN     ACC: [id number] EXAM:  MR SPINE THORACIC   ORDERED BY: WILMA RAHMAN     ACC: [id number] EXAM:  MR SPINE CERVICAL   ORDERED BY: WILMA RAHMAN     PROCEDURE DATE:  09/27/2024          INTERPRETATION:  CLINICAL HISTORY: b/l SDH r/o CSF leak    COMPARISON: None.    TECHNIQUE: Cervical spine, thoracic, and lumbar MRI: Multiplanar,   multisequence MR images of the total spine are obtained without   administration of intravenous gadolinium.    FINDINGS:    CERVICAL SPINE MRI:  Severe disc desiccation at C5/C6. Mild retrolisthesis of C5 over C6.  Unremarkable T1 marrow signal. No cord signal abnormality. No evidence of   cord compression. No bone marrow edema. There isno evidence for acute   fracture. A normal lordosis is noted. Craniocervical junction is normal.   The cervicovertebral body heights and intervertebral disc spaces are   preserved. There is no prevertebral soft tissue abnormality. No evidence   of CSF leak.      Evaluation of the individual levels:  C2/C3 level: No spinal canal stenosis or foraminal narrowing.  C3/C4 level: No spinal canal stenosis or foraminal narrowing.  C4/C5 level: No spinal canal stenosis or foraminal narrowing.  C5/C6 level: Broad-based disc herniation. Mild flattening the ventral   aspect of the cord. Mild bilateral foraminal narrowing. No spinal canal   stenosis.  C6/C7 level: No spinal canal stenosis or foraminal narrowing.  C7/T1 level:  No spinal canal stenosis or foraminal narrowing. Small left   foraminal perineural root sleeve cyst. Tiny right foraminal perineural   root sleeve cyst.    THORACIC SPINE MRI:    Moderate multilevel discogenic degenerative changes. No evidence of cord   compression course abnormality. No soft tissue abnormality. No evidence   of CSF leak.    Moderate size hemangioma in the T9 vertebral body. Tiny bilateral T1/T2   foraminal perineural root sleeve cyst. Small left T4/T5 foraminal   perineural root sleeve cyst. Small right T6/T7 foraminal perineural root   sleeve cyst. Small right T7/T8 foraminal perineural root sleeve cyst.   Small right T8/T9 perineural root sleeve cyst. Moderate left T10/T11, 1.2   cm, perineural root sleeve cyst. Tiny left T11/T12 foraminal perineural   root sleeve cyst.      LUMBAR SPINE MRI:  The conus medullaris ends at the distal L1 level. Vertebral body heights   are maintained. Severe loss of disc space height at L5/S1. 6.3 mm   anterolisthesis of L5 over S1. No bone marrow edema. Mild nonspecific   diffuse posterior subcutaneous edema. Shallow broad-based disc herniation   at L4/L5 with a midline annular fissure. No spinal canal stenosis or   foraminal narrowing at any level. No evidence of a CSF leak.    IMPRESSION:    No evidence of a CSF leak. Multilevel thoracic spine foraminal perineural   sleeve cysts.    --- End of Report ---    KAILEE CHRISTIE MD; Attending Radiologist  This document has been electronically signed. Sep 27 2024  3:01PM    Plan:   NS stable  Monitor HA profile, trial of Fioricet  Replete K, re-check at 3pm, morning labs   Cardio consult called for echo abnormality and medicine concerns for CAD/stenting needs  Check cortisol/acth as possible source for headaches, today's draw late, check 7am tomorrow   Start IVF at 125cc/hr, encourage laying flat when in bed, but no formal HOB/activity restrictions at this time   Neuro checks q4/vitals q4  SBP less than 160  SCDs in bed, chemo ppx held   CW holding ASA  Bowel Regimen  Incentive monalisa   Dr. Arvizu to see.     NS ADDN  After morning rounds, patient got up to use the bathroom and vomited. +headache. No focal deficits. He has just received oxy as well. Patient ordered for fioricet instead, reglan for additional nausea coverage and high dose IVF. CTH to check for any changes.    NSx PA Note  9/26/24 b/l MMA embo    HPI  59M with PMH kidney stones, possible TIA who presents today for b/l SDH. Per patient, he began having headaches about 5-6 days ago, came on gradually and now 8/10. He was taking intermittent tylenol and ibuprofen for pain which did help for some of the pain. He reports that the headache worsened today and was associated with one episode of nausea and vomiting. Due to symptoms, he presented to [facility] for workup. He underwent CTH which showed b/l chronic on subacute on acute SDH, 1cm each with no MLS. He was transferred to [facility] for neurosurgical evaluation. He admits to taking baby aspirin daily due to possible TIA, last took this am. He denies weakness, numbness, tingling, dizziness, difficulty walking.  (25 Sep 2024 16:53)    Interval/Overnight Events   Patient seen bedside. Downgraded from NSICU yesterday. Per overnight PA, patient with continued headache pain, tramadol not helping, switched to oxy, but oxy not helping either. Headache profile has not been consistent with CSF-like headaches. Hospitalist called this morning concerned about ECHO results and that patient has severe CAD/needs stents. Asked to place Cardiology consult- completed. LBM 9/26.     MEDICATIONS  (STANDING):  atorvastatin 40 milliGRAM(s) Oral at bedtime  chlorhexidine 2% Cloths 1 Application(s) Topical daily  influenza   Vaccine 0.5 milliLiter(s) IntraMuscular once  polyethylene glycol 3350 17 Gram(s) Oral daily  potassium chloride    Tablet ER 20 milliEquivalent(s) Oral once  potassium chloride   Powder 40 milliEquivalent(s) Oral once  senna 2 Tablet(s) Oral at bedtime  valproate sodium   IVPB 250 milliGRAM(s) IV Intermittent every 6 hours    MEDICATIONS  (PRN):  ondansetron Injectable 4 milliGRAM(s) IV Push every 6 hours PRN Nausea and/or Vomiting  oxyCODONE    IR 5 milliGRAM(s) Oral every 6 hours PRN Moderate Pain (4 - 6)  oxyCODONE    IR 10 milliGRAM(s) Oral every 6 hours PRN Severe Pain (7 - 10)    Vital Signs Last 24 Hrs  T(C): 36.8 (28 Sep 2024 08:00), Max: 37.1 (27 Sep 2024 16:01)  T(F): 98.3 (28 Sep 2024 08:00), Max: 98.8 (27 Sep 2024 16:01)  HR: 61 (28 Sep 2024 07:00) (53 - 73)  BP: 127/80 (28 Sep 2024 07:00) (104/76 - 134/90)  BP(mean): 94 (28 Sep 2024 07:00) (81 - 104)  RR: 15 (28 Sep 2024 07:00) (10 - 19)  SpO2: 100% (28 Sep 2024 07:00) (96% - 100%)    Parameters below as of 28 Sep 2024 04:00  Patient On (Oxygen Delivery Method): room air    Neuro-                             11.6   6.29  )-----------( 156      ( 28 Sep 2024 05:20 )             35.2   09-28    141  |  105  |  11.1  ----------------------------<  101[H]  3.4[L]   |  27.0  |  0.73    Ca    8.3[L]      28 Sep 2024 05:20  Phos  2.5     09-28  Mg     2.5     09-28    ACC: [id number] EXAM:  MR SPINE LUMBAR   ORDERED BY: WILMA RAHMAN     ACC: [id number] EXAM:  MR SPINE THORACIC   ORDERED BY: WILMA RAHMAN     ACC: [id number] EXAM:  MR SPINE CERVICAL   ORDERED BY: WILMA RAHMAN     PROCEDURE DATE:  09/27/2024          INTERPRETATION:  CLINICAL HISTORY: b/l SDH r/o CSF leak    COMPARISON: None.    TECHNIQUE: Cervical spine, thoracic, and lumbar MRI: Multiplanar,   multisequence MR images of the total spine are obtained without   administration of intravenous gadolinium.    FINDINGS:    CERVICAL SPINE MRI:  Severe disc desiccation at C5/C6. Mild retrolisthesis of C5 over C6.  Unremarkable T1 marrow signal. No cord signal abnormality. No evidence of   cord compression. No bone marrow edema. There isno evidence for acute   fracture. A normal lordosis is noted. Craniocervical junction is normal.   The cervicovertebral body heights and intervertebral disc spaces are   preserved. There is no prevertebral soft tissue abnormality. No evidence   of CSF leak.      Evaluation of the individual levels:  C2/C3 level: No spinal canal stenosis or foraminal narrowing.  C3/C4 level: No spinal canal stenosis or foraminal narrowing.  C4/C5 level: No spinal canal stenosis or foraminal narrowing.  C5/C6 level: Broad-based disc herniation. Mild flattening the ventral   aspect of the cord. Mild bilateral foraminal narrowing. No spinal canal   stenosis.  C6/C7 level: No spinal canal stenosis or foraminal narrowing.  C7/T1 level:  No spinal canal stenosis or foraminal narrowing. Small left   foraminal perineural root sleeve cyst. Tiny right foraminal perineural   root sleeve cyst.    THORACIC SPINE MRI:    Moderate multilevel discogenic degenerative changes. No evidence of cord   compression course abnormality. No soft tissue abnormality. No evidence   of CSF leak.    Moderate size hemangioma in the T9 vertebral body. Tiny bilateral T1/T2   foraminal perineural root sleeve cyst. Small left T4/T5 foraminal   perineural root sleeve cyst. Small right T6/T7 foraminal perineural root   sleeve cyst. Small right T7/T8 foraminal perineural root sleeve cyst.   Small right T8/T9 perineural root sleeve cyst. Moderate left T10/T11, 1.2   cm, perineural root sleeve cyst. Tiny left T11/T12 foraminal perineural   root sleeve cyst.      LUMBAR SPINE MRI:  The conus medullaris ends at the distal L1 level. Vertebral body heights   are maintained. Severe loss of disc space height at L5/S1. 6.3 mm   anterolisthesis of L5 over S1. No bone marrow edema. Mild nonspecific   diffuse posterior subcutaneous edema. Shallow broad-based disc herniation   at L4/L5 with a midline annular fissure. No spinal canal stenosis or   foraminal narrowing at any level. No evidence of a CSF leak.    IMPRESSION:    No evidence of a CSF leak. Multilevel thoracic spine foraminal perineural   sleeve cysts.    --- End of Report ---    KAILEE CHRISTIE MD; Attending Radiologist  This document has been electronically signed. Sep 27 2024  3:01PM    Plan:   NS stable  Monitor HA profile, trial of Fioricet  Replete K, re-check at 3pm, morning labs   Cardio consult called for echo abnormality and medicine concerns for CAD/stenting needs  Check cortisol/acth as possible source for headaches, today's draw late, check 7am tomorrow   Start IVF at 125cc/hr, encourage laying flat when in bed, but no formal HOB/activity restrictions at this time   Neurology consult for input on headaches  pain at temples, check ESR  Neuro checks q4/vitals q4  SBP less than 160  SCDs in bed, chemo ppx held   CW holding ASA  Bowel Regimen  Incentive monalisa   Dr. Arvizu to see.     NS ADDN  After morning rounds, patient got up to use the bathroom and vomited. +headache. No focal deficits. He has just received oxy as well. Patient ordered for fioricet instead, reglan for additional nausea coverage and high dose IVF. CTH to check for any changes.

## 2024-09-28 NOTE — PROVIDER CONTACT NOTE (OTHER) - SITUATION
patient previously walking to bathroom- now patient is more lethargic and had episode of urinating on himself.

## 2024-09-28 NOTE — CONSULT NOTE ADULT - PROBLEM SELECTOR RECOMMENDATION 9
- here for SDH bilateral acute on chronic   - found to have normal EF but has some WMA basal inferior segment   - pt has reportedly had normal echo and stress 3 years ago   - we will check CCTA on monday  - If CCTA is abnormal, may potentially require cath but at that point would need DAPT clearance from NSICU   - we will follow   - monitor over the weekend Addended by: Britany Shukla on: 10/5/2022 03:02 PM     Modules accepted: Orders

## 2024-09-28 NOTE — CONSULT NOTE ADULT - ASSESSMENT
59M with PMH kidney stones, possible TIA who presents today for b/l SDH. Per patient, he began having headaches about 5-6 days ago, came on gradually and now 8/10. He was taking intermittent tylenol and ibuprofen for pain which did help for some of the pain. He reports that the headache worsened today and was associated with one episode of nausea and vomiting. Due to symptoms, he presented to [facility] for workup. He underwent CTH which showed b/l chronic on subacute on acute SDH, 1cm each with no MLS. He was transferred to [facility] for neurosurgical evaluation. He admits to taking baby aspirin daily due to possible TIA, last took this am. He denies weakness, numbness, tingling, dizziness, difficulty walking

## 2024-09-28 NOTE — CONSULT NOTE ADULT - HEMATOLOGY/LYMPHATICS
Currently on using rescue inhaler albuterol she tells me once or twice a week, I recommended that she was back on her inhaled corticosteroid as a maintenance therapy negative

## 2024-09-28 NOTE — CONSULT NOTE ADULT - ASSESSMENT
The patient is a 59y Male who is followed by neurology because of headache s/p b/l SDH and MMA embolization on 9/26.    Headache  b/l SDH and recent angio for MMA embolization  can continue depacon and fioricet as needed  can try solumedrol 80 mg IV qd if headache persists  he has fluctating mental status    - would minimize opiates/opiods    - will check continuous EEG    will follow with you    Vikash Wheeler MD PhD   [id number]

## 2024-09-28 NOTE — CONSULT NOTE ADULT - NS ATTEND AMEND GEN_ALL_CORE FT
60 y/o M with PMH nephrolithiasis, possible TIA, initially presented to [facility] c/o HA and n/v; was found to have b/l acute on chronic SDH. Was transferred to [facility] for further management; underwent cerebral angiography with MMA embolization 9/26. He underwent TTE with LVEF 66%, basal inferior hypokinesis; cardiology was consulted for further management.   -Obtain EKG  -No symptoms suggestive of ACS  -Will plan for CCTA 9/30 for further evaluation; if abnormal, would need neurosurgical clearance for heparin and DAPT prior to undergoing any invasive cardiac workup   -Check lipid panel  -BP management per neurosurgery
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
## 2024-09-28 NOTE — PROVIDER CONTACT NOTE (CRITICAL VALUE NOTIFICATION) - ACTION/TREATMENT ORDERED:
replacements ordered and given to patient  education provided  EKG done  troponin level drawn and sent to lab

## 2024-09-28 NOTE — CONSULT NOTE ADULT - SUBJECTIVE AND OBJECTIVE BOX
[facility]                                              CARDIOLOGY AT [place] [address]                                             Telephone: [phone]. Fax:[phone]                                                       CARDIOLOGY CONSULTATION NOTE                                                                                             History obtained by: Patient and medical record   Community Cardiologist: Glynn    obtained: Yes [  ] No [  ]  Reason for Consultation: abnormal echo   Available out pt records reviewed: Yes [  ] No [  ]    Chief complaint:    Patient is a 59y old  Male who presents with a chief complaint of b/l SDH (28 Sep 2024 14:47)      HPI:  59M with PMH kidney stones, possible TIA who presents today for b/l SDH. Per patient, he began having headaches about 5-6 days ago, came on gradually and now 8/10. He was taking intermittent tylenol and ibuprofen for pain which did help for some of the pain. He reports that the headache worsened today and was associated with one episode of nausea and vomiting. Due to symptoms, he presented to [facility] for workup. He underwent CTH which showed b/l chronic on subacute on acute SDH, 1cm each with no MLS. He was transferred to [facility] for neurosurgical evaluation. He admits to taking baby aspirin daily due to possible TIA, last took this am. He denies weakness, numbness, tingling, dizziness, difficulty walking.  (25 Sep 2024 16:53)      PAST MEDICAL HISTORY  No pertinent past medical history    Brain TIA    Kidney stones        PAST SURGICAL HISTORY  No significant past surgical history        SUBSTANCE USE HISTORY  Denies current and previous substance use [  ]   CIGARETTES -   ALCOHOL -   DRUGS -     FAMILY HISTORY:      CARDIAC SPECIFIC FAMILY HX   No KNOWN family history of Cardiovascular disease, CAD, or sudden death in first degree relatives unless specified below  Family History of Cardiovascular Disease:  [  ]   Coronary Artery Disease in first degree relative:  [  ]   Sudden Cardiac Death in First degree relative: [  ]    HOME MEDICATIONS:  aspirin 81 mg oral capsule: 1 cap(s) orally once a day (26 Sep 2024 11:45)  atorvastatin 40 mg oral tablet: 1 tab(s) orally once a day (26 Sep 2024 10:01)      CURRENT CARDIAC MEDICATIONS:      CURRENT OTHER MEDICATIONS:  acetaminophen 325 mG/butalbital 50 mG/caffeine 40 mG 2 Tablet(s) Oral every 6 hours, Stop order after: 5 Days PRN severe headache or migraine  metoclopramide Injectable 10 milliGRAM(s) IV Push every 6 hours PRN nausea/vomiting  ondansetron Injectable 4 milliGRAM(s) IV Push every 6 hours PRN Nausea and/or Vomiting  oxyCODONE    IR 5 milliGRAM(s) Oral every 6 hours PRN Moderate Pain (4 - 6)  oxyCODONE    IR 10 milliGRAM(s) Oral every 6 hours PRN Severe Pain (7 - 10)  valproate sodium   IVPB 250 milliGRAM(s) IV Intermittent every 6 hours  polyethylene glycol 3350 17 Gram(s) Oral daily  senna 2 Tablet(s) Oral at bedtime  atorvastatin 40 milliGRAM(s) Oral at bedtime  chlorhexidine 2% Cloths 1 Application(s) Topical daily  influenza   Vaccine 0.5 milliLiter(s) IntraMuscular once  sodium chloride 0.9%. 1000 milliLiter(s) (125 mL/Hr) IV Continuous <Continuous>      ALLERGIES:   No Known Allergies      VITAL SIGNS:  T(C): 36.9 (09-28-24 @ 12:00), Max: 36.9 (09-28-24 @ 12:00)  T(F): 98.4 (09-28-24 @ 12:00), Max: 98.4 (09-28-24 @ 12:00)  HR: 53 (09-28-24 @ 15:00) (53 - 73)  BP: 144/88 (09-28-24 @ 14:00) (104/76 - 148/86)  RR: 15 (09-28-24 @ 15:00) (10 - 19)  SpO2: 99% (09-28-24 @ 15:00) (96% - 100%)    INTAKE AND OUTPUT:     09-27 @ 07:01  -  09-28 @ 07:00  --------------------------------------------------------  IN: 2850 mL / OUT: 2300 mL / NET: 550 mL    09-28 @ 07:01  -  09-28 @ 16:11  --------------------------------------------------------  IN: 425 mL / OUT: 1200 mL / NET: -775 mL        LABS:                            11.6   6.29  )-----------( 156      ( 28 Sep 2024 05:20 )             35.2     09-28    141  |  105  |  11.1  ----------------------------<  101[H]  3.4[L]   |  27.0  |  0.73    Ca    8.3[L]      28 Sep 2024 05:20  Phos  2.5     09-28  Mg     2.5     09-28    Urinalysis Basic - ( 28 Sep 2024 05:20 )    Color: x / Appearance: x / SG: x / pH: x  Gluc: 101 mg/dL / Ketone: x  / Bili: x / Urobili: x   Blood: x / Protein: x / Nitrite: x   Leuk Esterase: x / RBC: x / WBC x   Sq Epi: x / Non Sq Epi: x / Bacteria: x          Thyroid Stimulating Hormone, Serum: 0.32 uIU/mL (09-28-24 @ 05:20)      RADIOLOGY IMAGING:   CT Angio Cardiac w/ IV Cont: Routine   Indication: r/o CAD  Transport: Stretcher-Jesus  w/ Monitor  Provider's Contact #: [phone] (09-28-24 @ 15:12) [Ordered.]

## 2024-09-28 NOTE — CONSULT NOTE ADULT - ASSESSMENT
59 M with h/o possible prior TIAs (x2) ~ 4 years ago, maintained on ASA and neprholithia                                                                                                                                                                                                                                                                                                                                  59 M with h/o possible prior TIAs (x2) ~ 4 years ago, maintained on ASA, h/o nephrolithiasis transferred from [place] after presenting with a headache x 5 days  and found to have b/l SDH, s/p MMA embolization . Serial imaging stable. TTE done, conerning for RWA w/o any clicnial symptoms of CAD / ACS    SDH   Non traumatic   Continue management per Nsx  Neurochecks (Q1/Q2) per neurosx  Recommend obtaining CDs of CT/MRI/MRA imaging (done at [facility]) 4 years ago to compare with new imaging   TTE noted, recommend consulting cardiology to discuss potential need for ischemic w/u (understanding risks/ benefits of LHC IF considered and DAPT use (IF indicated) given time from spontaneous ICH  Serial imaging per primary team   Maintain strict glycemic and BP parameters (Glucose 120-160)   Send A1C and lipid panel   w/u for headaches per primary team    Hypokalemia   Recommend STAT repeating of pm labs along with magnesium levels (concern that may be dilutional from IVF if obtained from same limb)  IF repeat low recommend IOV and PO repletion and       DVT ppx : Per primary team

## 2024-09-29 LAB
ACTH SER-ACNC: 25.3 PG/ML — SIGNIFICANT CHANGE UP (ref 7.2–63.3)
ACTH SER-ACNC: 45 PG/ML — SIGNIFICANT CHANGE UP (ref 7.2–63.3)
ANION GAP SERPL CALC-SCNC: 10 MMOL/L — SIGNIFICANT CHANGE UP (ref 5–17)
BUN SERPL-MCNC: 11.1 MG/DL — SIGNIFICANT CHANGE UP (ref 8–20)
CALCIUM SERPL-MCNC: 8.9 MG/DL — SIGNIFICANT CHANGE UP (ref 8.4–10.5)
CALCIUM SERPL-MCNC: 9.4 MG/DL — SIGNIFICANT CHANGE UP (ref 8.4–10.5)
CHLORIDE SERPL-SCNC: 105 MMOL/L — SIGNIFICANT CHANGE UP (ref 96–108)
CO2 SERPL-SCNC: 25 MMOL/L — SIGNIFICANT CHANGE UP (ref 22–29)
CORTIS AM PEAK SERPL-MCNC: 16.3 UG/DL — SIGNIFICANT CHANGE UP (ref 6–18.4)
CREAT SERPL-MCNC: 0.72 MG/DL — SIGNIFICANT CHANGE UP (ref 0.5–1.3)
EGFR: 104 ML/MIN/1.73M2 — SIGNIFICANT CHANGE UP
ERYTHROCYTE [SEDIMENTATION RATE] IN BLOOD: 31 MM/HR — HIGH (ref 0–15)
GLUCOSE SERPL-MCNC: 114 MG/DL — HIGH (ref 70–99)
MAGNESIUM SERPL-MCNC: 2.3 MG/DL — SIGNIFICANT CHANGE UP (ref 1.6–2.6)
PHOSPHATE SERPL-MCNC: 2.8 MG/DL — SIGNIFICANT CHANGE UP (ref 2.4–4.7)
POTASSIUM SERPL-MCNC: 4 MMOL/L — SIGNIFICANT CHANGE UP (ref 3.5–5.3)
POTASSIUM SERPL-SCNC: 4 MMOL/L — SIGNIFICANT CHANGE UP (ref 3.5–5.3)
PTH-INTACT FLD-MCNC: 33 PG/ML — SIGNIFICANT CHANGE UP (ref 15–65)
SODIUM SERPL-SCNC: 140 MMOL/L — SIGNIFICANT CHANGE UP (ref 135–145)
VALPROATE SERPL-MCNC: 99.4 UG/ML — SIGNIFICANT CHANGE UP (ref 50–100)

## 2024-09-29 PROCEDURE — 99232 SBSQ HOSP IP/OBS MODERATE 35: CPT

## 2024-09-29 PROCEDURE — 95720 EEG PHY/QHP EA INCR W/VEEG: CPT

## 2024-09-29 PROCEDURE — 99233 SBSQ HOSP IP/OBS HIGH 50: CPT

## 2024-09-29 PROCEDURE — 70450 CT HEAD/BRAIN W/O DYE: CPT | Mod: 26

## 2024-09-29 RX ORDER — LABETALOL HYDROCHLORIDE 200 MG/1
10 TABLET, FILM COATED ORAL EVERY 6 HOURS
Refills: 0 | Status: DISCONTINUED | OUTPATIENT
Start: 2024-09-29 | End: 2024-10-02

## 2024-09-29 RX ORDER — METOPROLOL TARTRATE 50 MG
50 TABLET ORAL ONCE
Refills: 0 | Status: COMPLETED | OUTPATIENT
Start: 2024-09-29 | End: 2024-09-30

## 2024-09-29 RX ORDER — HYDRALAZINE HYDROCHLORIDE 100 MG/1
10 TABLET ORAL EVERY 6 HOURS
Refills: 0 | Status: DISCONTINUED | OUTPATIENT
Start: 2024-09-29 | End: 2024-10-02

## 2024-09-29 RX ORDER — VALPROIC ACID 250 MG/1
500 CAPSULE ORAL EVERY 6 HOURS
Refills: 0 | Status: DISCONTINUED | OUTPATIENT
Start: 2024-09-29 | End: 2024-10-01

## 2024-09-29 RX ORDER — ACETAMINOPHEN 325 MG
1000 TABLET ORAL ONCE
Refills: 0 | Status: COMPLETED | OUTPATIENT
Start: 2024-09-29 | End: 2024-09-29

## 2024-09-29 RX ORDER — ENOXAPARIN SODIUM 150 MG/ML
40 INJECTION SUBCUTANEOUS EVERY 24 HOURS
Refills: 0 | Status: DISCONTINUED | OUTPATIENT
Start: 2024-09-29 | End: 2024-10-02

## 2024-09-29 RX ORDER — VALPROIC ACID 250 MG/1
1000 CAPSULE ORAL ONCE
Refills: 0 | Status: COMPLETED | OUTPATIENT
Start: 2024-09-29 | End: 2024-09-29

## 2024-09-29 RX ADMIN — VALPROIC ACID 52.5 MILLIGRAM(S): 250 CAPSULE ORAL at 05:58

## 2024-09-29 RX ADMIN — Medication 1000 MILLIGRAM(S): at 22:00

## 2024-09-29 RX ADMIN — VALPROIC ACID 52.5 MILLIGRAM(S): 250 CAPSULE ORAL at 00:07

## 2024-09-29 RX ADMIN — Medication 400 MILLIGRAM(S): at 16:42

## 2024-09-29 RX ADMIN — CHLORHEXIDINE GLUCONATE ORAL RINSE 1 APPLICATION(S): 1.2 SOLUTION DENTAL at 15:31

## 2024-09-29 RX ADMIN — VALPROIC ACID 60 MILLIGRAM(S): 250 CAPSULE ORAL at 09:43

## 2024-09-29 RX ADMIN — ATORVASTATIN CALCIUM 40 MILLIGRAM(S): 10 TABLET, FILM COATED ORAL at 00:08

## 2024-09-29 RX ADMIN — Medication 400 MILLIGRAM(S): at 21:30

## 2024-09-29 RX ADMIN — VALPROIC ACID 55 MILLIGRAM(S): 250 CAPSULE ORAL at 21:21

## 2024-09-29 RX ADMIN — Medication 1000 MILLIGRAM(S): at 09:23

## 2024-09-29 RX ADMIN — Medication 1000 MILLIGRAM(S): at 16:57

## 2024-09-29 RX ADMIN — ATORVASTATIN CALCIUM 40 MILLIGRAM(S): 10 TABLET, FILM COATED ORAL at 21:20

## 2024-09-29 RX ADMIN — VALPROIC ACID 55 MILLIGRAM(S): 250 CAPSULE ORAL at 15:29

## 2024-09-29 RX ADMIN — ENOXAPARIN SODIUM 40 MILLIGRAM(S): 150 INJECTION SUBCUTANEOUS at 21:20

## 2024-09-29 RX ADMIN — Medication 17 GRAM(S): at 15:28

## 2024-09-29 RX ADMIN — Medication 400 MILLIGRAM(S): at 09:08

## 2024-09-29 RX ADMIN — Medication 2 TABLET(S): at 21:20

## 2024-09-29 NOTE — PROGRESS NOTE ADULT - ASSESSMENT
The patient is a 59y Male who is followed by neurology because of headache s/p b/l SDH and MMA embolization on 9/26.    Headache  b/l SDH and recent angio for MMA embolization  prelim EEG reporteed left telporal spikes, not developing to full seizure    - load depacon 1000 mg, increase standing dose to 500 mg q6h, check level in AM    - continue cvEEG, may need addition of lacosamide depending on repsonse to depakote on EEG  can continue fioricet as needed for headache  can try solumedrol 80 mg IV qd if headache worsens  he has fluctuating mental status, family concerned with inability to form new memories/short term memory loss    - would minimize opiates/opioids    - will continue continuous video EEG monitoring    will follow with you    Vikash Wheeler MD PhD   [id number]

## 2024-09-29 NOTE — EEG REPORT - NS EEG TEXT BOX
RITO JOHNSON [id number]     Study Date: 09-28-24 16:45 to 09-29-24 05:27  Duration: 13 hr 55 min    --------------------------------------------------------------------------------------------------  History:  CC/ HPI Patient is a 59y old  Male who presents with a chief complaint of b/l SDH (29 Sep 2024 10:17)    MEDICATIONS  (STANDING):  atorvastatin 40 milliGRAM(s) Oral at bedtime  chlorhexidine 2% Cloths 1 Application(s) Topical daily  influenza   Vaccine 0.5 milliLiter(s) IntraMuscular once  polyethylene glycol 3350 17 Gram(s) Oral daily  senna 2 Tablet(s) Oral at bedtime  sodium chloride 0.9%. 1000 milliLiter(s) (50 mL/Hr) IV Continuous <Continuous>  valproate sodium   IVPB 500 milliGRAM(s) IV Intermittent every 6 hours    --------------------------------------------------------------------------------------------------  Study Interpretation:    [[[Abbreviation Key:  PDR=alpha rhythm/posterior dominant rhythm. A-P=anterior posterior gradient.  Amplitude: ‘very low’:<20; ‘low’:20-50; ‘medium’:; ‘high’:>200uV.  Persistence for periodic/rhythmic patterns (% of epoch) ‘rare’:<1%; ‘occasional’:1-10%; ‘frequent’:10-50%; ‘abundant’:50-90%; ‘continuous’:>90%.  Persistence for sporadic discharges: ‘rare’:<1/hr; ‘occasional’:1/min-1/hr; ‘frequent’:>1/min; ‘abundant’:>1/10 sec.  GRDA=generalized rhythmic delta activity; FIRDA=frontal intermittent GRDA; LRDA=lateralized rhythmic delta activity; TIRDA=temporal intermittent rhythmic delta activity;  LPD=PLED=lateralized periodic discharges; GPD=generalized periodic discharges; BiPDs=BiPLEDs=bilateral independent periodic epileptiform discharges; SIRPID=stimulus induced rhythmic, periodic, or ictal appearing discharges; BIRDs=brief potentially ictal rhythmic discharges >4 Hz, lasting .5-10s; PFA=paroxysmal bursts of beta/gamma; LVFA=low voltage fast activity.  Modifiers: +F=with fast component; +S=with spike component; +R=with rhythmic component.  S-B=burst suppression pattern.  Max=maximal. N1-drowsy; N2-stage II sleep; N3-slow wave sleep. SSS/BETS=small sharp spikes/benign epileptiform transients of sleep. HV=hyperventilation; PS=photic stimulation]]]    FINDINGS:      Background:  Continuity: Continuous  Symmetry: Symmetric  PDR: 9.5 - 10 Hz activity, with amplitude to 40 uV, that attenuated to eye opening.    Reactivity: Present  Voltage: Normal (between 20-150uV)  Anterior Posterior Gradient: Present  Other background findings: None  Breach: Absent    Background Slowing:  Generalized slowing: None was present.  Focal slowing: None was present.    State Changes:   -Drowsiness noted with increased slowing, attenuation of fast activity, vertex transients.  -Present with N2 sleep transients with symmetric spindles and K-complexes.    Sporadic Epileptiform Discharges:    Frequent left frontotemporal spike activity with maximum over FP1-F7.    Rhythmic and Periodic Patterns (RPPs):  None     Electrographic and Electroclinical seizures:  None    Other Clinical Events:  None    Activation Procedures:   -Hyperventilation was not performed.    -Photic stimulation was performed and did not elicit any abnormalities.       Artifacts:  Intermittent myogenic and movement artifacts were noted.    ECG:  The heart rate on single channel ECG was predominantly regular rhythm.    EEG Classification / Summary:  Abnormal EEG study  Frequent left frontotemporal spike activity with maximum over FP1-F7.      -----------------------------------------------------------------------------------------------------    Clinical Impression:  Left frontotemporal spikes which indicates increased risk of seizure activity from this region.  There were no seizures recorded.      This is a preliminary impression still pending attendings disposition.     -------------------------------------------------------------------------------------------------------  [facility] EEG Reading Room Ph#: [phone]  Epilepsy Answering Service after 5PM and before 8:30AM: Ph#: [phone]    Greg Wilkes DO  Epilepsy Fellow   RITO JOHNSON [id number]     Study Date: 09-28-24 16:45 to 09-29-24 05:27  Duration: 13 hr 55 min    --------------------------------------------------------------------------------------------------  History:  CC/ HPI Patient is a 59y old  Male who presents with a chief complaint of b/l SDH (29 Sep 2024 10:17)    MEDICATIONS  (STANDING):  atorvastatin 40 milliGRAM(s) Oral at bedtime  chlorhexidine 2% Cloths 1 Application(s) Topical daily  influenza   Vaccine 0.5 milliLiter(s) IntraMuscular once  polyethylene glycol 3350 17 Gram(s) Oral daily  senna 2 Tablet(s) Oral at bedtime  sodium chloride 0.9%. 1000 milliLiter(s) (50 mL/Hr) IV Continuous <Continuous>  valproate sodium   IVPB 500 milliGRAM(s) IV Intermittent every 6 hours    --------------------------------------------------------------------------------------------------  Study Interpretation:    [[[Abbreviation Key:  PDR=alpha rhythm/posterior dominant rhythm. A-P=anterior posterior gradient.  Amplitude: ‘very low’:<20; ‘low’:20-50; ‘medium’:; ‘high’:>200uV.  Persistence for periodic/rhythmic patterns (% of epoch) ‘rare’:<1%; ‘occasional’:1-10%; ‘frequent’:10-50%; ‘abundant’:50-90%; ‘continuous’:>90%.  Persistence for sporadic discharges: ‘rare’:<1/hr; ‘occasional’:1/min-1/hr; ‘frequent’:>1/min; ‘abundant’:>1/10 sec.  GRDA=generalized rhythmic delta activity; FIRDA=frontal intermittent GRDA; LRDA=lateralized rhythmic delta activity; TIRDA=temporal intermittent rhythmic delta activity;  LPD=PLED=lateralized periodic discharges; GPD=generalized periodic discharges; BiPDs=BiPLEDs=bilateral independent periodic epileptiform discharges; SIRPID=stimulus induced rhythmic, periodic, or ictal appearing discharges; BIRDs=brief potentially ictal rhythmic discharges >4 Hz, lasting .5-10s; PFA=paroxysmal bursts of beta/gamma; LVFA=low voltage fast activity.  Modifiers: +F=with fast component; +S=with spike component; +R=with rhythmic component.  S-B=burst suppression pattern.  Max=maximal. N1-drowsy; N2-stage II sleep; N3-slow wave sleep. SSS/BETS=small sharp spikes/benign epileptiform transients of sleep. HV=hyperventilation; PS=photic stimulation]]]    FINDINGS:      Background:  Continuity: Continuous  Symmetry: Symmetric  PDR: 8.5 Hz activity, with amplitude to 40 uV, that attenuated to eye opening.    Reactivity: Present  Voltage: Normal (between 20-150uV)  Anterior Posterior Gradient: Present  Other background findings: None  Breach: Absent    Background Slowing:  Generalized slowing: Excess polymorphic theta.    Focal slowing: None was present.    State Changes:   -Drowsiness noted with increased slowing, attenuation of fast activity, vertex transients.  -Present with N2 sleep transients with symmetric spindles and K-complexes.    Sporadic Epileptiform Discharges:    Frequent left frontotemporal spike activity with maximum over FP1-F7.    Rhythmic and Periodic Patterns (RPPs):  None     Electrographic and Electroclinical seizures:  None    Other Clinical Events:  Prolong epoch of fluctuating polymorphic theta L>R fronto temporal with no evolution and unclear clinical significance.    Activation Procedures:   -Hyperventilation was not performed.    -Photic stimulation was performed and did not elicit any abnormalities.       Artifacts:  Intermittent myogenic and movement artifacts were noted.    ECG:  The heart rate on single channel ECG was predominantly regular rhythm.    EEG Classification / Summary:  Abnormal EEG study  Frequent left frontotemporal spike activity with maximum over FP1-F7.  Background slowing    -----------------------------------------------------------------------------------------------------    Clinical Impression:  Left frontotemporal spikes which indicates increased risk of seizure activity from this region with associated nonspecific cerebral dysfunction.  There were no seizures recorded.          -------------------------------------------------------------------------------------------------------  [facility] EEG Reading Room Ph#: [phone]  Epilepsy Answering Service after 5PM and before 8:30AM: Ph#: [phone]    Greg Wilkes DO  Epilepsy Fellow   RITO JOHNSON [id number]     Study Date: 09-28-24 16:45 to 09-29-24 05:27  Duration: 13 hr 55 min    --------------------------------------------------------------------------------------------------  History:  CC/ HPI Patient is a 59y old  Male who presents with a chief complaint of b/l SDH (29 Sep 2024 10:17)    MEDICATIONS  (STANDING):  atorvastatin 40 milliGRAM(s) Oral at bedtime  chlorhexidine 2% Cloths 1 Application(s) Topical daily  influenza   Vaccine 0.5 milliLiter(s) IntraMuscular once  polyethylene glycol 3350 17 Gram(s) Oral daily  senna 2 Tablet(s) Oral at bedtime  sodium chloride 0.9%. 1000 milliLiter(s) (50 mL/Hr) IV Continuous <Continuous>  valproate sodium   IVPB 500 milliGRAM(s) IV Intermittent every 6 hours    --------------------------------------------------------------------------------------------------  Study Interpretation:    [[[Abbreviation Key:  PDR=alpha rhythm/posterior dominant rhythm. A-P=anterior posterior gradient.  Amplitude: ‘very low’:<20; ‘low’:20-50; ‘medium’:; ‘high’:>200uV.  Persistence for periodic/rhythmic patterns (% of epoch) ‘rare’:<1%; ‘occasional’:1-10%; ‘frequent’:10-50%; ‘abundant’:50-90%; ‘continuous’:>90%.  Persistence for sporadic discharges: ‘rare’:<1/hr; ‘occasional’:1/min-1/hr; ‘frequent’:>1/min; ‘abundant’:>1/10 sec.  GRDA=generalized rhythmic delta activity; FIRDA=frontal intermittent GRDA; LRDA=lateralized rhythmic delta activity; TIRDA=temporal intermittent rhythmic delta activity;  LPD=PLED=lateralized periodic discharges; GPD=generalized periodic discharges; BiPDs=BiPLEDs=bilateral independent periodic epileptiform discharges; SIRPID=stimulus induced rhythmic, periodic, or ictal appearing discharges; BIRDs=brief potentially ictal rhythmic discharges >4 Hz, lasting .5-10s; PFA=paroxysmal bursts of beta/gamma; LVFA=low voltage fast activity.  Modifiers: +F=with fast component; +S=with spike component; +R=with rhythmic component.  S-B=burst suppression pattern.  Max=maximal. N1-drowsy; N2-stage II sleep; N3-slow wave sleep. SSS/BETS=small sharp spikes/benign epileptiform transients of sleep. HV=hyperventilation; PS=photic stimulation]]]    FINDINGS:      Background:  Continuity: Continuous  Symmetry: Symmetric  PDR: 8.5 Hz activity, with amplitude to 40 uV, that attenuated to eye opening.    Reactivity: Present  Voltage: Normal (between 20-150uV)  Anterior Posterior Gradient: Present  Other background findings: None  Breach: Absent    Background Slowing:  Generalized slowing: Excess polymorphic theta.    Focal slowing: None was present.    State Changes:   -Drowsiness noted with increased slowing, attenuation of fast activity, vertex transients.  -Present with N2 sleep transients with symmetric spindles and K-complexes.    Sporadic Epileptiform Discharges:    Frequent left frontotemporal sharp transients over FP1-F7.    Rhythmic and Periodic Patterns (RPPs):  None     Electrographic and Electroclinical seizures:  None    Other Clinical Events:  Prolong epoch of fluctuating polymorphic theta L>R fronto temporal with no evolution and unclear clinical significance.    Activation Procedures:   -Hyperventilation was not performed.    -Photic stimulation was performed and did not elicit any abnormalities.       Artifacts:  Intermittent myogenic and movement artifacts were noted.    ECG:  The heart rate on single channel ECG was predominantly regular rhythm.    EEG Classification / Summary:  Abnormal EEG study  Frequent left frontotemporal sharp transients over FP1-F7.  Background slowing    -----------------------------------------------------------------------------------------------------    Clinical Impression:  Mild nonspecific cerebral dysfunction.  There were no seizures recorded.          -------------------------------------------------------------------------------------------------------  [facility] EEG Reading Room Ph#: [phone]  Epilepsy Answering Service after 5PM and before 8:30AM: Ph#: [phone]    Greg Wilkes DO  Epilepsy Fellow

## 2024-09-29 NOTE — PROGRESS NOTE ADULT - ASSESSMENT
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      PLAN  - case d/w team  - no acute neurosurgical intervention indicated at this time, if neuro exam or CTH reveal worsening status will consider OR for evacuation. Patient neurologically intact today with stable CTH this am.  - STAT CTH if any acute change in mental status  - neuro checks q2  - pain control as needed, avoid over sedation  - AED for seizure ppx, EEG negative. Valproic acid loaded & standing dose increased today, level in AM  - neurology following, reccs appreciated  - medicine following, lipid panel sent, reccs appreciated  - normotension, prns  - cardiology following, plan for stress test in AM  - con't hold ASA 81 mg  - lbm 9/28, bowel reg  - scds, lovenox for dvt ppx  - PT/OT, oob as tolerated

## 2024-09-29 NOTE — PROGRESS NOTE ADULT - ASSESSMENT
59 M with h/o possible prior TIAs (x2) ~ 4 years ago, maintained on ASA, h/o nephrolithiasis transferred from [place] after presenting with a headache x 5 days  and found to have b/l SDH, s/p MMA embolization (9/26) . Serial head imaging stable. TTE with areas concerning for RWAs. Has been forgetful with increased somnolence (but easily arousable and appropriately responding). Started on vEEG    SDH   Non traumatic   Continue management per Nsx  Neurochecks (Q1/Q2) per neurosx  AM CT prelim report stable. Finalized official read pending   Recommend obtaining CDs of CT/MRI/MRA imaging (done at [facility]) 4 years ago to compare with new imaging   Currently on vEEG   On Valproic acid 250 mg IVPB Q6 for seizure ppx (potentially causing or contributing to somnolence)   A1C 5.6, (1st week consider targeting 5-8 mmol/L / 90- 144 or as per ICU protocol)   Recommend sending lipid panel     Hypokalemia / Hypocalcemia   Acute change in chemistry panel suggesting possible dilutional effect  Repeat all normalized   S. Osm 294. Na 140. Appropriate for target given bleed     Suspected CAD   Abnormal TTE with RWAs  Telemetry unremarkable   Cardiology eval noted. Agree with recs  Possible plan for CCTA tomorrow  Send fasting lipid panel         DVT ppx : Per primary team      59 M with h/o possible prior TIAs (x2) ~ 4 years ago, maintained on ASA, h/o nephrolithiasis transferred from [place] after presenting with a headache x 5 days  and found to have b/l SDH, s/p MMA embolization (9/26) . Serial head imaging stable. TTE with areas concerning for RWAs. Has been forgetful with increased somnolence (but easily arousable and appropriately responding). Started on vEEG    SDH   Non traumatic   Continue management per Nsx  Neurochecks (Q1/Q2) per neurosx  AM CT prelim report stable. Finalized official read pending   Recommend obtaining CDs of CT/MRI/MRA imaging (done at [facility]) 4 years ago to compare with new imaging   Currently on vEEG   On Valproic acid 250 mg IVPB Q6 for seizure ppx (potentially causing or contributing to somnolence)   A1C 5.6, (1st week consider targeting 5-8 mmol/L / 90- 144 or as per ICU protocol)   Recommend sending lipid panel     Hypokalemia / Hypocalcemia   Acute change in chemistry panel suggesting possible dilutional effect  Repeat all normalized   S. Osm 294. Na 140. Appropriate for target given bleed     Suspected CAD   Abnormal TTE with RWAs  Telemetry unremarkable. EKG NSR 75bpm   Cardiology eval noted. Agree with recs  Possible plan for CCTA tomorrow  Send fasting lipid panel         DVT ppx : Per primary team   d/w wife at bedside and daughter via phone

## 2024-09-29 NOTE — PROGRESS NOTE ADULT - SUBJECTIVE AND OBJECTIVE BOX
HPI: 59M with PMH kidney stones, possible TIA who presents today for b/l SDH. Per patient, he began having headaches about 5-6 days ago, came on gradually and now 8/10. He was taking intermittent tylenol and ibuprofen for pain which did help for some of the pain. He reports that the headache worsened today and was associated with one episode of nausea and vomiting. Due to symptoms, he presented to [facility] for workup. He underwent CTH which showed b/l chronic on subacute on acute SDH, 1cm each with no MLS. He was transferred to [facility] for neurosurgical evaluation. He admits to taking baby aspirin daily due to possible TIA, last took this am. He denies weakness, numbness, tingling, dizziness, difficulty walking.  (25 Sep 2024 16:53)      INTERVAL HPI/OVERNIGHT EVENTS:  59y Male seen lying comfortably in bed with family at bedside. No acute over night events, EEG negative. Pending cardiac stress test tomorrow. Neuro exam stable today as well as AM CTH.      Vital Signs Last 24 Hrs  T(C): 36.6 (29 Sep 2024 12:00), Max: 36.9 (28 Sep 2024 21:36)  T(F): 97.9 (29 Sep 2024 12:00), Max: 98.5 (28 Sep 2024 21:36)  HR: 72 (29 Sep 2024 13:00) (53 - 78)  BP: 145/96 (29 Sep 2024 12:00) (121/75 - 159/100)  BP(mean): 112 (29 Sep 2024 12:00) (87 - 117)  RR: 16 (29 Sep 2024 13:00) (12 - 18)  SpO2: 98% (29 Sep 2024 13:00) (96% - 100%)    Parameters below as of 29 Sep 2024 12:00  Patient On (Oxygen Delivery Method): room air        PHYSICAL EXAM:  GENERAL: NAD, well-groomed  HEAD:  Atraumatic, normocephalic  MENTAL STATUS: AAO x3; Awake. Opens eyes spontaneously. Appropriately conversant without aphasia, following simple commands.  CRANIAL NERVES: PERRL. EOMI without nystagmus. Facial sensation intact V1-3 distribution b/l. Face symmetric w/ normal eye closure and smile, tongue midline. Hearing grossly intact. Speech clear.   MOTOR: strength 5/5 b/l upper and lower extremities  SENSATION: grossly intact to light touch all extremities      LABS:                        11.6   6.29  )-----------( 156      ( 28 Sep 2024 05:20 )             35.2     09-29    140  |  105  |  11.1  ----------------------------<  114[H]  4.0   |  25.0  |  0.72    Ca    8.9      29 Sep 2024 07:00  Phos  2.8     09-29  Mg     2.3     09-29    TPro  6.4[L]  /  Alb  3.8  /  TBili  0.6  /  DBili  x   /  AST  20  /  ALT  14  /  AlkPhos  57  09-28      Urinalysis Basic - ( 29 Sep 2024 07:00 )    Color: x / Appearance: x / SG: x / pH: x  Gluc: 114 mg/dL / Ketone: x  / Bili: x / Urobili: x   Blood: x / Protein: x / Nitrite: x   Leuk Esterase: x / RBC: x / WBC x   Sq Epi: x / Non Sq Epi: x / Bacteria: x        09-28 @ 07:01 - 09-29 @ 07:00  --------------------------------------------------------  IN: 2770 mL / OUT: 5190 mL / NET: -2420 mL    09-29 @ 07:01 - 09-29 @ 14:37  --------------------------------------------------------  IN: 150 mL / OUT: 500 mL / NET: -350 mL        RADIOLOGY & ADDITIONAL TESTS:  < from: CT Head No Cont (09.29.24 @ 03:47) >  IMPRESSION:  Status post bilateral middle meningeal artery embolization.    Stable subdural hemorrhages withoutevidence of interval rebleeding.    Mass effect in both cerebral convexities with approximately 2 mm midline   shift to the right is stable.    No new intracranial findings.    < end of copied text >          CAPRINI SCORE [CLOT]:  Patient has an estimated Caprini score of greater than 5.  However, the patient's unique clinical situation will be addressed in an individual manner to determine appropriate anticoagulation treatment, if any.

## 2024-09-29 NOTE — PROGRESS NOTE ADULT - SUBJECTIVE AND OBJECTIVE BOX
[facility]                                     Neurology at [place]                                 Liam Davila, & Zeyad                                  [address]                                        [place], [place], [address]                                             [phone]    CC:   SDH, s/p MMA embolization and headache  HPI:  The patient is a 59y Male who presented with headaches for about a week, was found to have b/l SDH.  He had MMA embolization on 9/26.  He has continues b/l temporal headaches with fluctuating mental status.  Family says he is more sleepy and is having memory issues at times.  Neurology is asked to evaluate.    Interval history: appears more alert this morning, family concerned about short term memory loss    Review of systems (neurology): (+) improving headache.  No  dizziness. Denies weakness/numbness.  Denies speech/language deficits. Denies diplopia/blurred vision.  Denies confusion. family concerned about short term memory    MEDICATIONS  (STANDING):  atorvastatin 40 milliGRAM(s) Oral at bedtime  chlorhexidine 2% Cloths 1 Application(s) Topical daily  influenza   Vaccine 0.5 milliLiter(s) IntraMuscular once  polyethylene glycol 3350 17 Gram(s) Oral daily  senna 2 Tablet(s) Oral at bedtime  sodium chloride 0.9%. 1000 milliLiter(s) (50 mL/Hr) IV Continuous <Continuous>  valproate sodium   IVPB 500 milliGRAM(s) IV Intermittent every 6 hours    MEDICATIONS  (PRN):  acetaminophen 325 mG/butalbital 50 mG/caffeine 40 mG 2 Tablet(s) Oral every 6 hours PRN severe headache or migraine  metoclopramide Injectable 10 milliGRAM(s) IV Push every 6 hours PRN nausea/vomiting  ondansetron Injectable 4 milliGRAM(s) IV Push every 6 hours PRN Nausea and/or Vomiting  oxyCODONE    IR 5 milliGRAM(s) Oral every 6 hours PRN Moderate Pain (4 - 6)  oxyCODONE    IR 10 milliGRAM(s) Oral every 6 hours PRN Severe Pain (7 - 10)      Vital Signs Last 24 Hrs  T(C): 36.6 (29 Sep 2024 08:00), Max: 36.9 (28 Sep 2024 12:00)  T(F): 97.9 (29 Sep 2024 08:00), Max: 98.5 (28 Sep 2024 21:36)  HR: 58 (29 Sep 2024 09:00) (53 - 78)  BP: 159/100 (29 Sep 2024 08:00) (121/75 - 159/100)  BP(mean): 117 (29 Sep 2024 08:00) (87 - 117)  RR: 15 (29 Sep 2024 09:00) (12 - 18)  SpO2: 98% (29 Sep 2024 09:00) (96% - 100%)    Parameters below as of 29 Sep 2024 04:00  Patient On (Oxygen Delivery Method): room air      Detailed Neurologic Exam:    Mental status: The patient is sleepy, easily reoriented but fades out at times.  The patient is fully oriented in 3 spheres. The patient is oriented to current events. The patient is able to name objects, follow commands, repeat sentences.    Cranial nerves: Pupils equal and react symmetrically to light. There is no visual field deficit to confrontation. Extraocular motion is full with no nystagmus. There is no ptosis. Facial sensation is intact. Facial musculature is symmetric. Palate elevates symmetrically. Tongue is midline.    Motor: There is normal bulk and tone.  There is no tremor.  Strength is 5/5 in the right arm and leg.   Strength is 5/5 in the left arm and leg.    Sensation: Intact to light touch and pin in 4 extremities    Cerebellar: There is no dysmetria on finger to nose testing.    Gait : deferred    LABS:                         11.6   6.29  )-----------( 156      ( 28 Sep 2024 05:20 )             35.2       09-28    141  |  105  |  11.1  ----------------------------<  101[H]  3.4[L]   |  27.0  |  0.73    Ca    8.3[L]      28 Sep 2024 05:20  Phos  2.5     09-28  Mg     2.5     09-28        RADIOLOGY & ADDITIONAL STUDIES (independently reviewed unless otherwise noted):  CT Head No Cont (09.29.24 @ 03:47)   IMPRESSION:  Status post bilateral middle meningeal artery embolization.  Stable subdural hemorrhages without evidence of interval rebleeding.  Mass effect in both cerebral convexities with approximately 2 mm midline   shift to the right is stable.  No new intracranial findings.    CT Head No Cont (09.28.24 @ 13:43)   IMPRESSION:  The patient is status post middle meningeal artery embolization   bilaterally.  Holohemispheric subdural hemorrhages measuring up to approximately 1.2 cm   in caliber bilaterally and trace subdural hemorrhage along the cerebral   falx are stable.  No evidence of interval rebleeding.  Mass effect in both cerebral convexities with approximately 2 mm midline   shift to the right is stable.  No new intracranial findings.

## 2024-09-29 NOTE — PROGRESS NOTE ADULT - SUBJECTIVE AND OBJECTIVE BOX
[facility] Division of Hospital Medicine    Chief Complaint:  b/l SDH     SUBJECTIVE / OVERNIGHT EVENTS:  Pt examined laying in bed  Wife at bedside.   D/w wife and RN. Report that pt has been more forgetful.   Is awake and responds appropriately however more lethargic compared to yesterday  Currently on vEEG monitoring   Patient denies chest pain, SOB, abd pain, N/V, fever, chills, dysuria or any other complaints. All remainder ROS negative.       MEDICATIONS  (STANDING):  atorvastatin 40 milliGRAM(s) Oral at bedtime  chlorhexidine 2% Cloths 1 Application(s) Topical daily  influenza   Vaccine 0.5 milliLiter(s) IntraMuscular once  polyethylene glycol 3350 17 Gram(s) Oral daily  senna 2 Tablet(s) Oral at bedtime  sodium chloride 0.9%. 1000 milliLiter(s) (50 mL/Hr) IV Continuous <Continuous>  valproate sodium   IVPB 250 milliGRAM(s) IV Intermittent every 6 hours    MEDICATIONS  (PRN):  acetaminophen 325 mG/butalbital 50 mG/caffeine 40 mG 2 Tablet(s) Oral every 6 hours PRN severe headache or migraine  metoclopramide Injectable 10 milliGRAM(s) IV Push every 6 hours PRN nausea/vomiting  ondansetron Injectable 4 milliGRAM(s) IV Push every 6 hours PRN Nausea and/or Vomiting  oxyCODONE    IR 5 milliGRAM(s) Oral every 6 hours PRN Moderate Pain (4 - 6)  oxyCODONE    IR 10 milliGRAM(s) Oral every 6 hours PRN Severe Pain (7 - 10)        I&O's Summary    27 Sep 2024 07:01  -  28 Sep 2024 07:00  --------------------------------------------------------  IN: 2850 mL / OUT: 2300 mL / NET: 550 mL    28 Sep 2024 07:01  -  29 Sep 2024 06:57  --------------------------------------------------------  IN: 2770 mL / OUT: 5190 mL / NET: -2420 mL        PHYSICAL EXAM:  Vital Signs Last 24 Hrs  T(C): 36.5 (29 Sep 2024 00:27), Max: 36.9 (28 Sep 2024 12:00)  T(F): 97.7 (29 Sep 2024 00:27), Max: 98.5 (28 Sep 2024 21:36)  HR: 61 (29 Sep 2024 06:00) (53 - 78)  BP: 138/87 (29 Sep 2024 06:00) (120/77 - 151/96)  BP(mean): 102 (29 Sep 2024 06:00) (87 - 113)  RR: 15 (29 Sep 2024 06:00) (12 - 18)  SpO2: 96% (29 Sep 2024 06:00) (96% - 100%)    Parameters below as of 29 Sep 2024 04:00  Patient On (Oxygen Delivery Method): room air      CONSTITUTIONAL: Non toxic appearing, lying in bed on vEEG  ENMT: Moist oral mucosa, no pharyngeal injection or exudates; normal dentition  RESPIRATORY: Normal respiratory effort; lungs are clear to auscultation bilaterally  CARDIOVASCULAR: Regular rate and rhythm, normal S1 and S2, no murmur/rub/gallop; No lower extremity edema; Peripheral pulses are 2+ bilaterally  ABDOMEN: Nontender to palpation, normoactive bowel sounds, no rebound/guarding; No hepatosplenomegaly  MUSCLOSKELETAL:   no clubbing or cyanosis of digits; no joint swelling or tenderness to palpation  PSYCH: A+O to person, place, and time; affect appropriate  NEUROLOGY: CN 2-12 are intact and symmetric; no gross sensory deficits;   SKIN: No rashes; no palpable lesions      LABS:                        11.6   6.29  )-----------( 156      ( 28 Sep 2024 05:20 )             35.2     09-28    140  |  104  |  10.9  ----------------------------<  108[H]  4.2   |  22.0  |  0.76    Ca    9.0      28 Sep 2024 22:07  Phos  3.3     09-28  Mg     2.4     09-28    TPro  6.4[L]  /  Alb  3.8  /  TBili  0.6  /  DBili  x   /  AST  20  /  ALT  14  /  AlkPhos  57  09-28      CARDIAC MARKERS ( 28 Sep 2024 15:45 )  x     / x     / x     / x     / <1.0 ng/mL      Urinalysis Basic - ( 28 Sep 2024 22:07 )    Color: x / Appearance: x / SG: x / pH: x  Gluc: 108 mg/dL / Ketone: x  / Bili: x / Urobili: x   Blood: x / Protein: x / Nitrite: x   Leuk Esterase: x / RBC: x / WBC x   Sq Epi: x / Non Sq Epi: x / Bacteria: x          RADIOLOGY & ADDITIONAL TESTS:  9/25/24 CT Head  IMPRESSION:  1. Bilateral holohemispheric subdural hematomas which are multiloculated and of mixed density, suggesting both chronic, subacute, and acute components. Both collections measure approximately 1 cm without significant midline shift. Gyral and sulcal effacement.  2. No intraparenchymal hemorrhage, or infarct.  3. Widely patent head and neck vasculature, as outlined above.  4. No acute cervical fracture identified. Underlying degenerative changes most extensive at C5-C6. Upper thoracic degenerative changes also noted.    9/25/24 MRI Brain  Mildly heterogeneous holohemispheric subdural measure up to approximately 9 mm in caliber bilaterally, stable  measurements from the prior head CT scan on 09/25/24  There is diffuse mild dural thickening and enhancement bilaterally, without compelling evidence for an underlying mass.    9/26/24 TTE  CONCLUSIONS:   1. Left ventricular cavity is normal in size. Left ventricular wall thickness is mildly increased. Left ventricular systolic function is normal with an ejection fraction of 66 % by Nina's method of disks. Regional wall motion abnormalities present.   2. Basal inferior segment is abnormal.   3. Mildly enlarged right ventricular cavity size and normal right ventricular systolic function.   4. Estimated pulmonary artery systolic pressure is 21 mmHg.   5. No prior echocardiogram is available for comparison.    9/29/24 CT Head  PRELIMINARY  IMPRESSION:  Grossly unchanged size and appearance of mixed density, acute on chronic bilateral subdural hematomas measuring up to approximately 1.1 cm on the left and 1.2 cm right. Additional trace subdural blood products along the falx unchanged. Trace rightward midline shift of 2 mm unchanged. No new areas of hemorrhage.

## 2024-09-30 LAB
ANION GAP SERPL CALC-SCNC: 13 MMOL/L — SIGNIFICANT CHANGE UP (ref 5–17)
BUN SERPL-MCNC: 16 MG/DL — SIGNIFICANT CHANGE UP (ref 8–20)
CALCIUM SERPL-MCNC: 9.1 MG/DL — SIGNIFICANT CHANGE UP (ref 8.4–10.5)
CHLORIDE SERPL-SCNC: 105 MMOL/L — SIGNIFICANT CHANGE UP (ref 96–108)
CHOLEST SERPL-MCNC: 127 MG/DL — SIGNIFICANT CHANGE UP
CO2 SERPL-SCNC: 24 MMOL/L — SIGNIFICANT CHANGE UP (ref 22–29)
CREAT SERPL-MCNC: 0.71 MG/DL — SIGNIFICANT CHANGE UP (ref 0.5–1.3)
EGFR: 104 ML/MIN/1.73M2 — SIGNIFICANT CHANGE UP
GLUCOSE SERPL-MCNC: 103 MG/DL — HIGH (ref 70–99)
HCT VFR BLD CALC: 38.4 % — LOW (ref 39–50)
HDLC SERPL-MCNC: 57 MG/DL — SIGNIFICANT CHANGE UP
HGB BLD-MCNC: 13.1 G/DL — SIGNIFICANT CHANGE UP (ref 13–17)
LIPID PNL WITH DIRECT LDL SERPL: 52 MG/DL — SIGNIFICANT CHANGE UP
MCHC RBC-ENTMCNC: 31.1 PG — SIGNIFICANT CHANGE UP (ref 27–34)
MCHC RBC-ENTMCNC: 34.1 GM/DL — SIGNIFICANT CHANGE UP (ref 32–36)
MCV RBC AUTO: 91.2 FL — SIGNIFICANT CHANGE UP (ref 80–100)
NON HDL CHOLESTEROL: 70 MG/DL — SIGNIFICANT CHANGE UP
PLATELET # BLD AUTO: 186 K/UL — SIGNIFICANT CHANGE UP (ref 150–400)
POTASSIUM SERPL-MCNC: 4.1 MMOL/L — SIGNIFICANT CHANGE UP (ref 3.5–5.3)
POTASSIUM SERPL-SCNC: 4.1 MMOL/L — SIGNIFICANT CHANGE UP (ref 3.5–5.3)
RBC # BLD: 4.21 M/UL — SIGNIFICANT CHANGE UP (ref 4.2–5.8)
RBC # FLD: 13.9 % — SIGNIFICANT CHANGE UP (ref 10.3–14.5)
SODIUM SERPL-SCNC: 141 MMOL/L — SIGNIFICANT CHANGE UP (ref 135–145)
TRIGL SERPL-MCNC: 89 MG/DL — SIGNIFICANT CHANGE UP
VALPROATE SERPL-MCNC: 77.5 UG/ML — SIGNIFICANT CHANGE UP (ref 50–100)
WBC # BLD: 6.25 K/UL — SIGNIFICANT CHANGE UP (ref 3.8–10.5)
WBC # FLD AUTO: 6.25 K/UL — SIGNIFICANT CHANGE UP (ref 3.8–10.5)

## 2024-09-30 PROCEDURE — 99232 SBSQ HOSP IP/OBS MODERATE 35: CPT

## 2024-09-30 PROCEDURE — 99233 SBSQ HOSP IP/OBS HIGH 50: CPT

## 2024-09-30 PROCEDURE — 75574 CT ANGIO HRT W/3D IMAGE: CPT | Mod: 26

## 2024-09-30 PROCEDURE — 95718 EEG PHYS/QHP 2-12 HR W/VEEG: CPT

## 2024-09-30 RX ORDER — ACETAMINOPHEN 325 MG
1000 TABLET ORAL ONCE
Refills: 0 | Status: COMPLETED | OUTPATIENT
Start: 2024-09-30 | End: 2024-09-30

## 2024-09-30 RX ADMIN — Medication 2 TABLET(S): at 21:06

## 2024-09-30 RX ADMIN — Medication 2 TABLET(S): at 22:00

## 2024-09-30 RX ADMIN — CHLORHEXIDINE GLUCONATE ORAL RINSE 1 APPLICATION(S): 1.2 SOLUTION DENTAL at 12:24

## 2024-09-30 RX ADMIN — Medication 400 MILLIGRAM(S): at 12:23

## 2024-09-30 RX ADMIN — ENOXAPARIN SODIUM 40 MILLIGRAM(S): 150 INJECTION SUBCUTANEOUS at 17:04

## 2024-09-30 RX ADMIN — VALPROIC ACID 55 MILLIGRAM(S): 250 CAPSULE ORAL at 03:04

## 2024-09-30 RX ADMIN — Medication 400 MILLIGRAM(S): at 07:26

## 2024-09-30 RX ADMIN — VALPROIC ACID 55 MILLIGRAM(S): 250 CAPSULE ORAL at 23:01

## 2024-09-30 RX ADMIN — VALPROIC ACID 55 MILLIGRAM(S): 250 CAPSULE ORAL at 17:04

## 2024-09-30 RX ADMIN — Medication 2 TABLET(S): at 14:41

## 2024-09-30 RX ADMIN — VALPROIC ACID 55 MILLIGRAM(S): 250 CAPSULE ORAL at 12:23

## 2024-09-30 RX ADMIN — ATORVASTATIN CALCIUM 40 MILLIGRAM(S): 10 TABLET, FILM COATED ORAL at 21:06

## 2024-09-30 RX ADMIN — Medication 17 GRAM(S): at 12:24

## 2024-09-30 RX ADMIN — Medication 50 MILLIGRAM(S): at 08:19

## 2024-09-30 NOTE — PROGRESS NOTE ADULT - ASSESSMENT
59y Male who is followed by neurology because of headache status post bilateral subdural hematomas and MMA embolization on 9/26.    Headache  Has bilateral subdural hematomas and recent angio for MMA embolization.  EEG reported left temporal spikes, not developing to full seizure.  Further report after Valproic acid load was normal.  Continue Valproic acid as this is good for seizure prophylaxis and headache prophylaxis.  Can continue Fioricet as needed for headache  He has fluctuating mental status, family concerned with inability to form new memories/short term memory loss.  Minimize opiates/opioids.

## 2024-09-30 NOTE — EEG REPORT - NS EEG TEXT BOX
RITO JOHNSON [id number]     Study Date: 09-29-24 08:00 to 09-30-24 08:00   Duration: 23 hr 49 min    --------------------------------------------------------------------------------------------------  History:  CC/ HPI Patient is a 59y old  Male who presents with a chief complaint of b/l SDH (29 Sep 2024 14:36)    MEDICATIONS  (STANDING):  atorvastatin 40 milliGRAM(s) Oral at bedtime  chlorhexidine 2% Cloths 1 Application(s) Topical daily  enoxaparin Injectable 40 milliGRAM(s) SubCutaneous every 24 hours  influenza   Vaccine 0.5 milliLiter(s) IntraMuscular once  polyethylene glycol 3350 17 Gram(s) Oral daily  senna 2 Tablet(s) Oral at bedtime  sodium chloride 0.9%. 1000 milliLiter(s) (50 mL/Hr) IV Continuous <Continuous>  valproate sodium   IVPB 500 milliGRAM(s) IV Intermittent every 6 hours    --------------------------------------------------------------------------------------------------  Study Interpretation:    [[[Abbreviation Key:  PDR=alpha rhythm/posterior dominant rhythm. A-P=anterior posterior gradient.  Amplitude: ‘very low’:<20; ‘low’:20-50; ‘medium’:; ‘high’:>200uV.  Persistence for periodic/rhythmic patterns (% of epoch) ‘rare’:<1%; ‘occasional’:1-10%; ‘frequent’:10-50%; ‘abundant’:50-90%; ‘continuous’:>90%.  Persistence for sporadic discharges: ‘rare’:<1/hr; ‘occasional’:1/min-1/hr; ‘frequent’:>1/min; ‘abundant’:>1/10 sec.  GRDA=generalized rhythmic delta activity; FIRDA=frontal intermittent GRDA; LRDA=lateralized rhythmic delta activity; TIRDA=temporal intermittent rhythmic delta activity;  LPD=PLED=lateralized periodic discharges; GPD=generalized periodic discharges; BiPDs=BiPLEDs=bilateral independent periodic epileptiform discharges; SIRPID=stimulus induced rhythmic, periodic, or ictal appearing discharges; BIRDs=brief potentially ictal rhythmic discharges >4 Hz, lasting .5-10s; PFA=paroxysmal bursts of beta/gamma; LVFA=low voltage fast activity.  Modifiers: +F=with fast component; +S=with spike component; +R=with rhythmic component.  S-B=burst suppression pattern.  Max=maximal. N1-drowsy; N2-stage II sleep; N3-slow wave sleep. SSS/BETS=small sharp spikes/benign epileptiform transients of sleep. HV=hyperventilation; PS=photic stimulation]]]    FINDINGS:      Background:  Continuity: Continuous  Symmetry: Symmetric  PDR: Posterior dominant rhythm up to 8.5 Hz activity, with amplitude to 40 uV, that attenuated to eye opening.    Reactivity: Present  Voltage: Normal (between 20-150uV)  Anterior Posterior Gradient: Present  Other background findings: None  Breach: Absent    Background Slowing:  Generalized slowing: None was present.  Focal slowing: None was present.    State Changes:   -Drowsiness noted with increased slowing, attenuation of fast activity, vertex transients.  -Present with N2 sleep transients with symmetric spindles and K-complexes.    Sporadic Epileptiform Discharges:    None    Rhythmic and Periodic Patterns (RPPs):  None     Electrographic and Electroclinical seizures:  None    Other Clinical Events:  None    Activation Procedures:   -Hyperventilation was not performed.    -Photic stimulation was not performed.     Artifacts:  Intermittent myogenic and movement artifacts were noted.    ECG:  The heart rate on single channel ECG was predominantly between 60 - 70 BPM.    EEG Classification / Summary:  Normal EEG study    -----------------------------------------------------------------------------------------------------    Clinical Impression:  Normal EEG study in the awake / drowsy / asleep states.    There were no epileptiform abnormalities recorded.      Artifact from EEG degradation limits interpretation of the recording.    This is a preliminary impression still pending attendings disposition.     -------------------------------------------------------------------------------------------------------  [facility] EEG Reading Room Ph#: [phone]  Epilepsy Answering Service after 5PM and before 8:30AM: Ph#: [phone]    Greg Wilkes DO  Epilepsy Fellow   RITO JOHNSON [id number]     Study Date: 09-29-24 08:00 to 09-30-24 12:54  Duration: 28 hr 43 min    --------------------------------------------------------------------------------------------------  History:  CC/ HPI Patient is a 59y old  Male who presents with a chief complaint of b/l SDH (29 Sep 2024 14:36)    MEDICATIONS  (STANDING):  atorvastatin 40 milliGRAM(s) Oral at bedtime  chlorhexidine 2% Cloths 1 Application(s) Topical daily  enoxaparin Injectable 40 milliGRAM(s) SubCutaneous every 24 hours  influenza   Vaccine 0.5 milliLiter(s) IntraMuscular once  polyethylene glycol 3350 17 Gram(s) Oral daily  senna 2 Tablet(s) Oral at bedtime  sodium chloride 0.9%. 1000 milliLiter(s) (50 mL/Hr) IV Continuous <Continuous>  valproate sodium   IVPB 500 milliGRAM(s) IV Intermittent every 6 hours    --------------------------------------------------------------------------------------------------  Study Interpretation:    [[[Abbreviation Key:  PDR=alpha rhythm/posterior dominant rhythm. A-P=anterior posterior gradient.  Amplitude: ‘very low’:<20; ‘low’:20-50; ‘medium’:; ‘high’:>200uV.  Persistence for periodic/rhythmic patterns (% of epoch) ‘rare’:<1%; ‘occasional’:1-10%; ‘frequent’:10-50%; ‘abundant’:50-90%; ‘continuous’:>90%.  Persistence for sporadic discharges: ‘rare’:<1/hr; ‘occasional’:1/min-1/hr; ‘frequent’:>1/min; ‘abundant’:>1/10 sec.  GRDA=generalized rhythmic delta activity; FIRDA=frontal intermittent GRDA; LRDA=lateralized rhythmic delta activity; TIRDA=temporal intermittent rhythmic delta activity;  LPD=PLED=lateralized periodic discharges; GPD=generalized periodic discharges; BiPDs=BiPLEDs=bilateral independent periodic epileptiform discharges; SIRPID=stimulus induced rhythmic, periodic, or ictal appearing discharges; BIRDs=brief potentially ictal rhythmic discharges >4 Hz, lasting .5-10s; PFA=paroxysmal bursts of beta/gamma; LVFA=low voltage fast activity.  Modifiers: +F=with fast component; +S=with spike component; +R=with rhythmic component.  S-B=burst suppression pattern.  Max=maximal. N1-drowsy; N2-stage II sleep; N3-slow wave sleep. SSS/BETS=small sharp spikes/benign epileptiform transients of sleep. HV=hyperventilation; PS=photic stimulation]]]    FINDINGS:      Background:  Continuity: Continuous  Symmetry: Symmetric  PDR: Posterior dominant rhythm up to 8.5 Hz activity, with amplitude to 40 uV, that attenuated to eye opening.    Reactivity: Present  Voltage: Normal (between 20-150uV)  Anterior Posterior Gradient: Present  Other background findings: None  Breach: Absent    Background Slowing:  Generalized slowing: None was present.  Focal slowing: None was present.    State Changes:   -Drowsiness noted with increased slowing, attenuation of fast activity, vertex transients.  -Present with N2 sleep transients with symmetric spindles and K-complexes.    Sporadic Epileptiform Discharges:    None    Rhythmic and Periodic Patterns (RPPs):  None     Electrographic and Electroclinical seizures:  None    Other Clinical Events:  None    Activation Procedures:   -Hyperventilation was not performed.    -Photic stimulation was not performed.     Artifacts:  Intermittent myogenic and movement artifacts were noted.    ECG:  The heart rate on single channel ECG was predominantly between 60 - 70 BPM.    EEG Classification / Summary:  Normal EEG study    -----------------------------------------------------------------------------------------------------    Clinical Impression:  Probably normal EEG study in the awake / drowsy / asleep states. There is significant artifact in several channels that partially limits interpretation.  There were no epileptiform abnormalities recorded.      -------------------------------------------------------------------------------------------------------  [facility] EEG Reading Room Ph#: [phone]  Epilepsy Answering Service after 5PM and before 8:30AM: Ph#: [phone]    rGeg Wilkes DO  Epilepsy Fellow    Luke Farley MD PhD  Director, Epilepsy Division, [facility]

## 2024-09-30 NOTE — PROGRESS NOTE ADULT - SUBJECTIVE AND OBJECTIVE BOX
[facility]                                        Neurology at [place]                                 Liam Davila, & Zeyad                                  [address]                                        [place], [place], [address]                                             [phone]        CC: subdural hematoma and headache     HPI:   The patient is a 59y Male who presented with headaches for about a week, was found to have b/l SDH.  He had MMA embolization on 9/26.  He has continues b/l temporal headaches with fluctuating mental status.  Family says he is more sleepy and is having memory issues at times.  Neurology is asked to evaluate. (AR).    Interim history:  Remains in 3 [facility].    ROS:   Denies headache or dizziness.  Denies chest pain.  Denies shortness of breath.    MEDICATIONS  (STANDING):  atorvastatin 40 milliGRAM(s) Oral at bedtime  chlorhexidine 2% Cloths 1 Application(s) Topical daily  enoxaparin Injectable 40 milliGRAM(s) SubCutaneous every 24 hours  influenza   Vaccine 0.5 milliLiter(s) IntraMuscular once  polyethylene glycol 3350 17 Gram(s) Oral daily  senna 2 Tablet(s) Oral at bedtime  sodium chloride 0.9%. 1000 milliLiter(s) (50 mL/Hr) IV Continuous <Continuous>  valproate sodium   IVPB 500 milliGRAM(s) IV Intermittent every 6 hours    Vital Signs Last 24 Hrs  T(C): 36.8 (30 Sep 2024 08:05), Max: 36.9 (29 Sep 2024 16:44)  T(F): 98.2 (30 Sep 2024 08:05), Max: 98.5 (29 Sep 2024 16:44)  HR: 66 (30 Sep 2024 08:00) (55 - 88)  BP: 133/79 (30 Sep 2024 08:00) (109/76 - 140/88)  BP(mean): 95 (30 Sep 2024 08:00) (87 - 106)  RR: 16 (30 Sep 2024 08:00) (11 - 19)  SpO2: 99% (30 Sep 2024 08:00) (93% - 99%)    Parameters below as of 30 Sep 2024 08:00  Patient On (Oxygen Delivery Method): room air    Detailed Neurologic Exam:    Mental status: The patient is awake and alert. There is no aphasia. There is no dysarthria.     Cranial nerves: Pupils equal and react symmetrically to light. There is no visual field deficit to threat. Extraocular motion is full with no nystagmus. Facial sensation is intact. Facial musculature is symmetric. Palate elevates symmetrically. Tongue is midline.    Motor: There is normal bulk and tone.  There is no tremor.  Strength grossly 5/5 bilaterally.    Sensation: Grossly intact to light touch and pin.    Reflexes: 2+ throughout and plantar responses are flexor.    Cerebellar: No dysmetria on finger nose testing.    Labs:     09-30    141  |  105  |  16.0  ----------------------------<  103[H]  4.1   |  24.0  |  0.71    Ca    9.1      30 Sep 2024 03:15  Phos  2.8     09-29  Mg     2.3     09-29                              13.1   6.25  )-----------( 186      ( 30 Sep 2024 03:15 )             38.4       EEG normal

## 2024-09-30 NOTE — PROGRESS NOTE ADULT - SUBJECTIVE AND OBJECTIVE BOX
HPI:  59M with PMH kidney stones, possible TIA who presents today for b/l SDH. Per patient, he began having headaches about 5-6 days ago, came on gradually and now 8/10. He was taking intermittent tylenol and ibuprofen for pain which did help for some of the pain. He reports that the headache worsened today and was associated with one episode of nausea and vomiting. Due to symptoms, he presented to [facility] for workup. He underwent CTH which showed b/l chronic on subacute on acute SDH, 1cm each with no MLS. He was transferred to [facility] for neurosurgical evaluation. He admits to taking baby aspirin daily due to possible TIA, last took this am. He denies weakness, numbness, tingling, dizziness, difficulty walking.  (25 Sep 2024 16:53)      INTERVAL HPI/OVERNIGHT EVENTS:  59M seen lying in bed with daughter at bedside. Pt has no acute complaints this AM. More awake & alert.    Vital Signs Last 24 Hrs  T(C): 36.8 (30 Sep 2024 08:05), Max: 36.9 (29 Sep 2024 16:44)  T(F): 98.2 (30 Sep 2024 08:05), Max: 98.5 (29 Sep 2024 16:44)  HR: 66 (30 Sep 2024 08:00) (55 - 88)  BP: 133/79 (30 Sep 2024 08:00) (109/76 - 140/88)  BP(mean): 95 (30 Sep 2024 08:00) (87 - 106)  RR: 16 (30 Sep 2024 08:00) (11 - 19)  SpO2: 99% (30 Sep 2024 08:00) (93% - 99%)    Parameters below as of 30 Sep 2024 08:00  Patient On (Oxygen Delivery Method): room air    PHYSICAL EXAM:  GENERAL: NAD  HEAD: vEEG leads in place, normocephalic  MENTAL STATUS: AAO x3; Opens eyes spontaneously; Appropriately conversant without aphasia; following simple commands  CRANIAL NERVES: Visual acuity normal for age, visual fields full to confrontation, PERRL. EOMI without nystagmus. Facial sensation intact V1-3 distribution b/l. Face symmetric w/ normal eye closure and smile, tongue midline.   MOTOR: strength 5/5 b/l upper and lower extremities  SENSATION: grossly intact to light touch all extremities      LABS:                        13.1   6.25  )-----------( 186      ( 30 Sep 2024 03:15 )               38.4     09-30    141  |  105  |  16.0  ----------------------------<  103[H]  4.1   |  24.0  |  0.71    Ca    9.1      30 Sep 2024 03:15  Phos  2.8     09-29  Mg     2.3     09-29        Urinalysis Basic - ( 30 Sep 2024 03:15 )    Color: x / Appearance: x / SG: x / pH: x  Gluc: 103 mg/dL / Ketone: x  / Bili: x / Urobili: x   Blood: x / Protein: x / Nitrite: x   Leuk Esterase: x / RBC: x / WBC x   Sq Epi: x / Non Sq Epi: x / Bacteria: x        09-29 @ 07:01 - 09-30 @ 07:00  --------------------------------------------------------  IN: 1400 mL / OUT: 2875 mL / NET: -1475 mL    09-30 @ 07:01 - 09-30 @ 12:03  --------------------------------------------------------  IN: 100 mL / OUT: 350 mL / NET: -250 mL        RADIOLOGY & ADDITIONAL TESTS:  CT Head No Cont 09.29.24:  IMPRESSION:  Status post bilateral middle meningeal artery embolization.    Stable subdural hemorrhages withoutevidence of interval rebleeding.    Mass effect in both cerebral convexities with approximately 2 mm midline   shift to the right is stable.    No new intracranial findings.      --- End of Report ---      CT Head No Cont 09.29.24:  IMPRESSION:  Status post bilateral middle meningeal artery embolization.    Stable subdural hemorrhages withoutevidence of interval rebleeding.    Mass effect in both cerebral convexities with approximately 2 mm midline   shift to the right is stable.    No new intracranial findings.      --- End of Report ---

## 2024-09-30 NOTE — PROGRESS NOTE ADULT - ASSESSMENT
59 M with h/o possible prior TIAs (x2) ~ 4 years ago, maintained on ASA, h/o nephrolithiasis transferred from [place] after presenting with a headache x 5 days  and found to have b/l SDH, s/p MMA embolization (9/26) . Serial head imaging stable. TTE with areas concerning for RWAs. Has been forgetful with increased somnolence (but easily arousable and appropriately responding). Started on vEEG.     SDH   Non traumatic   - Continue management per Nsx  - Neurochecks (Q1/Q2) per neurosx  - repeat CTH stable 9/29     - on vEEG   - EEG with reported left temporal spikes, not developing to full seizure  - Per Neuro, was loaded with Depacon 1000 mg, and increased standing dose to 500 mg q6h   - Neuro following   - A1C 5.6   - Lipid panel     Suspected CAD   - Abnormal TTE with RWAs  - Telemetry unremarkable  - EKG NSR 75bpm   - Cardiology eval noted   - Plan for CCTA today       DVT ppx : Per primary team      59 M with h/o possible prior TIAs (x2) ~ 4 years ago, maintained on ASA, h/o nephrolithiasis transferred from [place] after presenting with a headache x 5 days  and found to have b/l SDH, s/p MMA embolization (9/26) . Serial head imaging stable. TTE with areas concerning for RWAs. Has been forgetful with increased somnolence (but easily arousable and appropriately responding). Started on vEEG.     SDH   Non traumatic   - Continue management per Nsx  - Neurochecks (Q1/Q2) per neurosx  - repeat CTH stable 9/29     - on vEEG   - EEG with reported left temporal spikes, not developing to full seizure  - Per Neuro, was loaded with Depacon 1000 mg, and increased standing dose to 500 mg q6h   - Neuro following   - A1C 5.6   - Lipid panel reviewed     Suspected CAD   - Abnormal TTE with RWAs  - Telemetry unremarkable  - EKG NSR 75bpm   - Cardiology eval noted   - Plan for CCTA today       DVT ppx : Per primary team

## 2024-09-30 NOTE — PROGRESS NOTE ADULT - SUBJECTIVE AND OBJECTIVE BOX
[facility] Division of Hospital Medicine    Chief Complaint:   b/l SDH     SUBJECTIVE / OVERNIGHT EVENTS:      Patient denies chest pain, SOB, abd pain, N/V, fever, chills, dysuria or any other complaints. All remainder ROS negative.     MEDICATIONS  (STANDING):  atorvastatin 40 milliGRAM(s) Oral at bedtime  chlorhexidine 2% Cloths 1 Application(s) Topical daily  enoxaparin Injectable 40 milliGRAM(s) SubCutaneous every 24 hours  influenza   Vaccine 0.5 milliLiter(s) IntraMuscular once  polyethylene glycol 3350 17 Gram(s) Oral daily  senna 2 Tablet(s) Oral at bedtime  sodium chloride 0.9%. 1000 milliLiter(s) (50 mL/Hr) IV Continuous <Continuous>  valproate sodium   IVPB 500 milliGRAM(s) IV Intermittent every 6 hours    MEDICATIONS  (PRN):  acetaminophen 325 mG/butalbital 50 mG/caffeine 40 mG 2 Tablet(s) Oral every 6 hours PRN severe headache or migraine  hydrALAZINE Injectable 10 milliGRAM(s) IV Push every 6 hours PRN SBP >160  labetalol Injectable 10 milliGRAM(s) IV Push every 6 hours PRN SBP >160  metoclopramide Injectable 10 milliGRAM(s) IV Push every 6 hours PRN nausea/vomiting  ondansetron Injectable 4 milliGRAM(s) IV Push every 6 hours PRN Nausea and/or Vomiting  oxyCODONE    IR 5 milliGRAM(s) Oral every 6 hours PRN Moderate Pain (4 - 6)  oxyCODONE    IR 10 milliGRAM(s) Oral every 6 hours PRN Severe Pain (7 - 10)        I&O's Summary    29 Sep 2024 07:01  -  30 Sep 2024 07:00  --------------------------------------------------------  IN: 1400 mL / OUT: 2875 mL / NET: -1475 mL    30 Sep 2024 07:01  -  30 Sep 2024 11:58  --------------------------------------------------------  IN: 100 mL / OUT: 350 mL / NET: -250 mL        PHYSICAL EXAM:  Vital Signs Last 24 Hrs  T(C): 36.8 (30 Sep 2024 08:05), Max: 36.9 (29 Sep 2024 16:44)  T(F): 98.2 (30 Sep 2024 08:05), Max: 98.5 (29 Sep 2024 16:44)  HR: 66 (30 Sep 2024 08:00) (55 - 88)  BP: 133/79 (30 Sep 2024 08:00) (109/76 - 145/96)  BP(mean): 95 (30 Sep 2024 08:00) (87 - 112)  RR: 16 (30 Sep 2024 08:00) (11 - 19)  SpO2: 99% (30 Sep 2024 08:00) (93% - 99%)    Parameters below as of 30 Sep 2024 08:00  Patient On (Oxygen Delivery Method): room air          CONSTITUTIONAL: NAD, sitting up in bed  RESPIRATORY: Normal respiratory effort; lungs are clear to auscultation bilaterally  CARDIOVASCULAR: Regular rate and rhythm, normal S1 and S2   ABDOMEN: Nontender to palpation, normoactive bowel sounds  MUSCULOSKELETAL:  No clubbing or cyanosis of digits   PSYCH: A+O to person, place, and time; affect appropriate  NEUROLOGY: No gross sensory or motor deficits       LABS:                        13.1   6.25  )-----------( 186      ( 30 Sep 2024 03:15 )             38.4     09-30    141  |  105  |  16.0  ----------------------------<  103[H]  4.1   |  24.0  |  0.71    Ca    9.1      30 Sep 2024 03:15  Phos  2.8     09-29  Mg     2.3     09-29        CARDIAC MARKERS ( 28 Sep 2024 15:45 )  x     / x     / x     / x     / <1.0 ng/mL      Urinalysis Basic - ( 30 Sep 2024 03:15 )    Color: x / Appearance: x / SG: x / pH: x  Gluc: 103 mg/dL / Ketone: x  / Bili: x / Urobili: x   Blood: x / Protein: x / Nitrite: x   Leuk Esterase: x / RBC: x / WBC x   Sq Epi: x / Non Sq Epi: x / Bacteria: x     [facility] Division of Hospital Medicine    Chief Complaint:   b/l SDH     SUBJECTIVE / OVERNIGHT EVENTS:  Pt reports he feels less sleepy today  Family at bedside     Patient denies chest pain, SOB, abd pain, N/V, fever, chills, dysuria or any other complaints. All remainder ROS negative.     MEDICATIONS  (STANDING):  atorvastatin 40 milliGRAM(s) Oral at bedtime  chlorhexidine 2% Cloths 1 Application(s) Topical daily  enoxaparin Injectable 40 milliGRAM(s) SubCutaneous every 24 hours  influenza   Vaccine 0.5 milliLiter(s) IntraMuscular once  polyethylene glycol 3350 17 Gram(s) Oral daily  senna 2 Tablet(s) Oral at bedtime  sodium chloride 0.9%. 1000 milliLiter(s) (50 mL/Hr) IV Continuous <Continuous>  valproate sodium   IVPB 500 milliGRAM(s) IV Intermittent every 6 hours    MEDICATIONS  (PRN):  acetaminophen 325 mG/butalbital 50 mG/caffeine 40 mG 2 Tablet(s) Oral every 6 hours PRN severe headache or migraine  hydrALAZINE Injectable 10 milliGRAM(s) IV Push every 6 hours PRN SBP >160  labetalol Injectable 10 milliGRAM(s) IV Push every 6 hours PRN SBP >160  metoclopramide Injectable 10 milliGRAM(s) IV Push every 6 hours PRN nausea/vomiting  ondansetron Injectable 4 milliGRAM(s) IV Push every 6 hours PRN Nausea and/or Vomiting  oxyCODONE    IR 5 milliGRAM(s) Oral every 6 hours PRN Moderate Pain (4 - 6)  oxyCODONE    IR 10 milliGRAM(s) Oral every 6 hours PRN Severe Pain (7 - 10)        I&O's Summary    29 Sep 2024 07:01  -  30 Sep 2024 07:00  --------------------------------------------------------  IN: 1400 mL / OUT: 2875 mL / NET: -1475 mL    30 Sep 2024 07:01  -  30 Sep 2024 11:58  --------------------------------------------------------  IN: 100 mL / OUT: 350 mL / NET: -250 mL        PHYSICAL EXAM:  Vital Signs Last 24 Hrs  T(C): 36.8 (30 Sep 2024 08:05), Max: 36.9 (29 Sep 2024 16:44)  T(F): 98.2 (30 Sep 2024 08:05), Max: 98.5 (29 Sep 2024 16:44)  HR: 66 (30 Sep 2024 08:00) (55 - 88)  BP: 133/79 (30 Sep 2024 08:00) (109/76 - 145/96)  BP(mean): 95 (30 Sep 2024 08:00) (87 - 112)  RR: 16 (30 Sep 2024 08:00) (11 - 19)  SpO2: 99% (30 Sep 2024 08:00) (93% - 99%)    Parameters below as of 30 Sep 2024 08:00  Patient On (Oxygen Delivery Method): room air          CONSTITUTIONAL: NAD, sitting up in bed  RESPIRATORY: Normal respiratory effort; lungs with fair BS bilaterally  CARDIOVASCULAR: Regular rate and rhythm, normal S1 and S2   ABDOMEN: Nontender to palpation, normoactive bowel sounds  PSYCH: A+O to person, place  NEUROLOGY: No gross sensory or motor deficits       LABS:                        13.1   6.25  )-----------( 186      ( 30 Sep 2024 03:15 )             38.4     09-30    141  |  105  |  16.0  ----------------------------<  103[H]  4.1   |  24.0  |  0.71    Ca    9.1      30 Sep 2024 03:15  Phos  2.8     09-29  Mg     2.3     09-29        CARDIAC MARKERS ( 28 Sep 2024 15:45 )  x     / x     / x     / x     / <1.0 ng/mL      Urinalysis Basic - ( 30 Sep 2024 03:15 )    Color: x / Appearance: x / SG: x / pH: x  Gluc: 103 mg/dL / Ketone: x  / Bili: x / Urobili: x   Blood: x / Protein: x / Nitrite: x   Leuk Esterase: x / RBC: x / WBC x   Sq Epi: x / Non Sq Epi: x / Bacteria: x

## 2024-09-30 NOTE — CHART NOTE - NSCHARTNOTEFT_GEN_A_CORE
TTE with LVEF 66%, basal inferior hypokinesis.   CT calcium performed which revealed minimal nonobstructive epicardial coronary artery disease. No coronary artery calcium (Agatston score 0).  Pt denies chest pain, chest pressure, palpitations, and anginal equivalent symptoms.   Pt had a nuclear stress test in 4/2018 which was normal.    Discussed with Dr. Palomares.   No further inpatient cardiac work up at this time.  Will sign off.  Please reconsult if needed. Pt had a nuclear stress test in 4/2018 which was normal.  TTE with LVEF 66%, basal inferior hypokinesis.   CT calcium performed which revealed minimal nonobstructive epicardial coronary artery disease.   No coronary artery calcium (Agatston score 0).    Pt denies chest pain, chest pressure, palpitations, and anginal equivalent symptoms.     Discussed with Dr. Palomares.   No further inpatient cardiac work up at this time.  Will sign off.  Please reconsult if needed.

## 2024-09-30 NOTE — PROGRESS NOTE ADULT - ASSESSMENT
59M PMH transient vision loss now on ASA 81mg presented with HA x5 days, found to have b/l mixed density holohemispheric SDH now s/p MMA embolization on 9/26 with waxing & waning exam with negative EEG.    Plan:  -Q2h neuro checks  -SCDs & Lovenox for DVT PPX  -Neurology following for seizure mgmt; continue vEEG (no seizures seen) & AED for seizure PPX  -PT/OOB as tolerated  -Pain control PRN  -SBP < 160  -Cardio following; now s/p stress test, will f/u further recs  -Medicine following; appreciate recs   -D/w Dr. Crowley

## 2024-10-01 LAB
ANION GAP SERPL CALC-SCNC: 12 MMOL/L — SIGNIFICANT CHANGE UP (ref 5–17)
BUN SERPL-MCNC: 18.9 MG/DL — SIGNIFICANT CHANGE UP (ref 8–20)
CALCIUM SERPL-MCNC: 9.3 MG/DL — SIGNIFICANT CHANGE UP (ref 8.4–10.5)
CHLORIDE SERPL-SCNC: 102 MMOL/L — SIGNIFICANT CHANGE UP (ref 96–108)
CO2 SERPL-SCNC: 24 MMOL/L — SIGNIFICANT CHANGE UP (ref 22–29)
CREAT SERPL-MCNC: 0.79 MG/DL — SIGNIFICANT CHANGE UP (ref 0.5–1.3)
EGFR: 101 ML/MIN/1.73M2 — SIGNIFICANT CHANGE UP
GLUCOSE SERPL-MCNC: 118 MG/DL — HIGH (ref 70–99)
HCT VFR BLD CALC: 38.9 % — LOW (ref 39–50)
HGB BLD-MCNC: 13 G/DL — SIGNIFICANT CHANGE UP (ref 13–17)
MAGNESIUM SERPL-MCNC: 2.2 MG/DL — SIGNIFICANT CHANGE UP (ref 1.6–2.6)
MCHC RBC-ENTMCNC: 30.6 PG — SIGNIFICANT CHANGE UP (ref 27–34)
MCHC RBC-ENTMCNC: 33.4 GM/DL — SIGNIFICANT CHANGE UP (ref 32–36)
MCV RBC AUTO: 91.5 FL — SIGNIFICANT CHANGE UP (ref 80–100)
PHOSPHATE SERPL-MCNC: 4.1 MG/DL — SIGNIFICANT CHANGE UP (ref 2.4–4.7)
PLATELET # BLD AUTO: 179 K/UL — SIGNIFICANT CHANGE UP (ref 150–400)
POTASSIUM SERPL-MCNC: 3.9 MMOL/L — SIGNIFICANT CHANGE UP (ref 3.5–5.3)
POTASSIUM SERPL-SCNC: 3.9 MMOL/L — SIGNIFICANT CHANGE UP (ref 3.5–5.3)
RBC # BLD: 4.25 M/UL — SIGNIFICANT CHANGE UP (ref 4.2–5.8)
RBC # FLD: 14.1 % — SIGNIFICANT CHANGE UP (ref 10.3–14.5)
SODIUM SERPL-SCNC: 138 MMOL/L — SIGNIFICANT CHANGE UP (ref 135–145)
WBC # BLD: 4.69 K/UL — SIGNIFICANT CHANGE UP (ref 3.8–10.5)
WBC # FLD AUTO: 4.69 K/UL — SIGNIFICANT CHANGE UP (ref 3.8–10.5)

## 2024-10-01 PROCEDURE — 99232 SBSQ HOSP IP/OBS MODERATE 35: CPT

## 2024-10-01 RX ORDER — ACETAMINOPHEN 325 MG
1000 TABLET ORAL ONCE
Refills: 0 | Status: COMPLETED | OUTPATIENT
Start: 2024-10-01 | End: 2024-10-01

## 2024-10-01 RX ORDER — ASTEMIZOLE 10 MG
500 TABLET ORAL EVERY 12 HOURS
Refills: 0 | Status: DISCONTINUED | OUTPATIENT
Start: 2024-10-01 | End: 2024-10-02

## 2024-10-01 RX ADMIN — Medication 2 TABLET(S): at 11:16

## 2024-10-01 RX ADMIN — ATORVASTATIN CALCIUM 40 MILLIGRAM(S): 10 TABLET, FILM COATED ORAL at 20:27

## 2024-10-01 RX ADMIN — Medication 1000 MILLIGRAM(S): at 05:00

## 2024-10-01 RX ADMIN — Medication 1000 MILLIGRAM(S): at 20:35

## 2024-10-01 RX ADMIN — CHLORHEXIDINE GLUCONATE ORAL RINSE 1 APPLICATION(S): 1.2 SOLUTION DENTAL at 11:53

## 2024-10-01 RX ADMIN — Medication 500 MILLIGRAM(S): at 17:56

## 2024-10-01 RX ADMIN — Medication 2 TABLET(S): at 16:34

## 2024-10-01 RX ADMIN — VALPROIC ACID 55 MILLIGRAM(S): 250 CAPSULE ORAL at 05:06

## 2024-10-01 RX ADMIN — Medication 2 TABLET(S): at 10:16

## 2024-10-01 RX ADMIN — Medication 400 MILLIGRAM(S): at 04:13

## 2024-10-01 RX ADMIN — ENOXAPARIN SODIUM 40 MILLIGRAM(S): 150 INJECTION SUBCUTANEOUS at 17:57

## 2024-10-01 RX ADMIN — Medication 400 MILLIGRAM(S): at 20:27

## 2024-10-01 RX ADMIN — Medication 2 TABLET(S): at 17:34

## 2024-10-01 NOTE — PROGRESS NOTE ADULT - PROVIDER SPECIALTY LIST ADULT
Neurosurgery
Hospitalist
Neurology
Internal Medicine
Neurology
Hospitalist
Intervent Radiology
Neurology
Neurosurgery
NSICU
NSICU

## 2024-10-01 NOTE — OCCUPATIONAL THERAPY INITIAL EVALUATION ADULT - PLANNED THERAPY INTERVENTIONS, OT EVAL
ADL retraining/balance training/motor coordination training/neuromuscular re-education/strengthening/transfer training

## 2024-10-01 NOTE — PHYSICAL THERAPY INITIAL EVALUATION ADULT - ADDITIONAL COMMENTS
as per pt, lives in a 2-story home with wife, 5 LATESHA with bilateral HR and a full flight of stairs inside with right HR, independent prior, no prior DME, right hand dominant, works, drives

## 2024-10-01 NOTE — PROGRESS NOTE ADULT - REASON FOR ADMISSION
b/l SDH

## 2024-10-01 NOTE — PROGRESS NOTE ADULT - ASSESSMENT
59y Male who is followed by neurology because of headache status post bilateral subdural hematomas and MMA embolization on 9/26.    Subdural hematoma   Now status post MMA embolization    Headache  Has bilateral subdural hematomas and recent angio for MMA embolization.  EEG reported left temporal spikes, not developing to full seizure.  Further report after Valproic acid load was normal.  Daughter wished to minimize seizure medication.   Will change Valproic acid to  q 12 h for now.  Can continue Fioricet as needed for headache  Memory improved.  Minimize opiates/opioids.

## 2024-10-01 NOTE — OCCUPATIONAL THERAPY INITIAL EVALUATION ADULT - DIAGNOSIS, OT EVAL
Bilat SDH chronic on subacute on acute without midline shift; s/p MMA embolization with pipeline x2 and coils

## 2024-10-01 NOTE — PROGRESS NOTE ADULT - ASSESSMENT
59M PMH transient vision loss now on ASA 81mg presented with HA x5 days, found to have b/l mixed density holohemispheric SDH now s/p MMA embolization on 9/26 with waxing & waning exam with negative EEG. Patient is in good spirit today and hemodynamically stable.    Plan:  -Q4h neuro checks, appropriate for telemetry  -SCDs & Lovenox for DVT PPX  -neurology recs appreciated     --Video EEG discontinued, no seizures on report from 9/30     -Continue valproic acid 500 Q12  -PT/OOB as tolerated  -Pain control PRN  -SBP < 160  -Cardio following; recs appreciated     CT calcium preformed Agatston score 0, TTE LVEF 66%, nuclear test normal, no further inpatient cardio workup needed  -Medicine following; appreciate recs   -PT eval today: Outpatient vestibular rehab  -OT eval: pending  -D/w Dr. Crowley

## 2024-10-01 NOTE — PROGRESS NOTE ADULT - SUBJECTIVE AND OBJECTIVE BOX
[facility] Division of Hospital Medicine    Chief Complaint:   b/l SDH     SUBJECTIVE / OVERNIGHT EVENTS:      Patient denies chest pain, SOB, abd pain, N/V, fever, chills, dysuria or any other complaints. All remainder ROS negative.     MEDICATIONS  (STANDING):  atorvastatin 40 milliGRAM(s) Oral at bedtime  chlorhexidine 2% Cloths 1 Application(s) Topical daily  divalproex  milliGRAM(s) Oral every 12 hours  enoxaparin Injectable 40 milliGRAM(s) SubCutaneous every 24 hours  influenza   Vaccine 0.5 milliLiter(s) IntraMuscular once  polyethylene glycol 3350 17 Gram(s) Oral daily  senna 2 Tablet(s) Oral at bedtime    MEDICATIONS  (PRN):  acetaminophen 325 mG/butalbital 50 mG/caffeine 40 mG 2 Tablet(s) Oral every 6 hours PRN severe headache or migraine  hydrALAZINE Injectable 10 milliGRAM(s) IV Push every 6 hours PRN SBP >160  labetalol Injectable 10 milliGRAM(s) IV Push every 6 hours PRN SBP >160  metoclopramide Injectable 10 milliGRAM(s) IV Push every 6 hours PRN nausea/vomiting  ondansetron Injectable 4 milliGRAM(s) IV Push every 6 hours PRN Nausea and/or Vomiting  oxyCODONE    IR 5 milliGRAM(s) Oral every 6 hours PRN Moderate Pain (4 - 6)  oxyCODONE    IR 10 milliGRAM(s) Oral every 6 hours PRN Severe Pain (7 - 10)        I&O's Summary    30 Sep 2024 07:01  -  01 Oct 2024 07:00  --------------------------------------------------------  IN: 1510 mL / OUT: 1050 mL / NET: 460 mL        PHYSICAL EXAM:  Vital Signs Last 24 Hrs  T(C): 36.9 (01 Oct 2024 08:39), Max: 37.2 (30 Sep 2024 16:00)  T(F): 98.4 (01 Oct 2024 08:39), Max: 98.9 (30 Sep 2024 16:00)  HR: 79 (01 Oct 2024 10:00) (54 - 83)  BP: 119/85 (01 Oct 2024 10:00) (94/62 - 126/91)  BP(mean): 96 (01 Oct 2024 10:00) (73 - 102)  RR: 17 (01 Oct 2024 10:00) (13 - 20)  SpO2: 100% (01 Oct 2024 10:00) (95% - 100%)    Parameters below as of 01 Oct 2024 08:00  Patient On (Oxygen Delivery Method): room air        CONSTITUTIONAL: NAD, sitting up in bed  RESPIRATORY: Normal respiratory effort; lungs with fair BS bilaterally  CARDIOVASCULAR: Regular rate and rhythm, normal S1 and S2   ABDOMEN: Nontender to palpation, normoactive bowel sounds  PSYCH: A+O to person, place  NEUROLOGY: No gross sensory or motor deficits         LABS:                        13.0   4.69  )-----------( 179      ( 01 Oct 2024 03:50 )             38.9     10-01    138  |  102  |  18.9  ----------------------------<  118[H]  3.9   |  24.0  |  0.79    Ca    9.3      01 Oct 2024 03:50  Phos  4.1     10-01  Mg     2.2     10-01        Urinalysis Basic - ( 01 Oct 2024 03:50 )    Color: x / Appearance: x / SG: x / pH: x  Gluc: 118 mg/dL / Ketone: x  / Bili: x / Urobili: x   Blood: x / Protein: x / Nitrite: x   Leuk Esterase: x / RBC: x / WBC x   Sq Epi: x / Non Sq Epi: x / Bacteria: x       [facility] Division of Hospital Medicine    Chief Complaint:   b/l SDH     SUBJECTIVE / OVERNIGHT EVENTS:  Pt reports no complaints     Patient denies chest pain, SOB, abd pain, N/V, fever, chills, dysuria or any other complaints. All remainder ROS negative.     MEDICATIONS  (STANDING):  atorvastatin 40 milliGRAM(s) Oral at bedtime  chlorhexidine 2% Cloths 1 Application(s) Topical daily  divalproex  milliGRAM(s) Oral every 12 hours  enoxaparin Injectable 40 milliGRAM(s) SubCutaneous every 24 hours  influenza   Vaccine 0.5 milliLiter(s) IntraMuscular once  polyethylene glycol 3350 17 Gram(s) Oral daily  senna 2 Tablet(s) Oral at bedtime    MEDICATIONS  (PRN):  acetaminophen 325 mG/butalbital 50 mG/caffeine 40 mG 2 Tablet(s) Oral every 6 hours PRN severe headache or migraine  hydrALAZINE Injectable 10 milliGRAM(s) IV Push every 6 hours PRN SBP >160  labetalol Injectable 10 milliGRAM(s) IV Push every 6 hours PRN SBP >160  metoclopramide Injectable 10 milliGRAM(s) IV Push every 6 hours PRN nausea/vomiting  ondansetron Injectable 4 milliGRAM(s) IV Push every 6 hours PRN Nausea and/or Vomiting  oxyCODONE    IR 5 milliGRAM(s) Oral every 6 hours PRN Moderate Pain (4 - 6)  oxyCODONE    IR 10 milliGRAM(s) Oral every 6 hours PRN Severe Pain (7 - 10)        I&O's Summary    30 Sep 2024 07:01  -  01 Oct 2024 07:00  --------------------------------------------------------  IN: 1510 mL / OUT: 1050 mL / NET: 460 mL        PHYSICAL EXAM:  Vital Signs Last 24 Hrs  T(C): 36.9 (01 Oct 2024 08:39), Max: 37.2 (30 Sep 2024 16:00)  T(F): 98.4 (01 Oct 2024 08:39), Max: 98.9 (30 Sep 2024 16:00)  HR: 79 (01 Oct 2024 10:00) (54 - 83)  BP: 119/85 (01 Oct 2024 10:00) (94/62 - 126/91)  BP(mean): 96 (01 Oct 2024 10:00) (73 - 102)  RR: 17 (01 Oct 2024 10:00) (13 - 20)  SpO2: 100% (01 Oct 2024 10:00) (95% - 100%)    Parameters below as of 01 Oct 2024 08:00  Patient On (Oxygen Delivery Method): room air        CONSTITUTIONAL: NAD, sitting up in bed  RESPIRATORY: Normal respiratory effort; lungs with fair BS bilaterally  CARDIOVASCULAR: Regular rate and rhythm, normal S1 and S2   ABDOMEN: Nontender to palpation, normoactive bowel sounds  PSYCH: A+O to person, place  NEUROLOGY: No gross sensory or motor deficits         LABS:                        13.0   4.69  )-----------( 179      ( 01 Oct 2024 03:50 )             38.9     10-01    138  |  102  |  18.9  ----------------------------<  118[H]  3.9   |  24.0  |  0.79    Ca    9.3      01 Oct 2024 03:50  Phos  4.1     10-01  Mg     2.2     10-01        Urinalysis Basic - ( 01 Oct 2024 03:50 )    Color: x / Appearance: x / SG: x / pH: x  Gluc: 118 mg/dL / Ketone: x  / Bili: x / Urobili: x   Blood: x / Protein: x / Nitrite: x   Leuk Esterase: x / RBC: x / WBC x   Sq Epi: x / Non Sq Epi: x / Bacteria: x

## 2024-10-01 NOTE — PROGRESS NOTE ADULT - ASSESSMENT
59 M with h/o possible prior TIAs (x2) ~ 4 years ago, maintained on ASA, h/o nephrolithiasis transferred from [place] after presenting with a headache x 5 days  and found to have b/l SDH, s/p MMA embolization (9/26) . Serial head imaging stable. TTE with areas concerning for RWAs. Has been forgetful with increased somnolence (but easily arousable and appropriately responding). Started on vEEG.     SDH   Non traumatic   - Continue management per Nsx  - Neuro checks per neurosx  - repeat CTH stable 9/29     - on vEEG   - EEG with reported left temporal spikes, not developing to full seizure  - Per Neuro, was loaded with Depacon 1000 mg, and increased standing dose to 500 mg q6h   - Neuro following, today Depacon dose reduced to 500 Q12H   - A1C 5.6   - Lipid panel reviewed     Minimal CAD   - Abnormal TTE with RWAs  - Telemetry unremarkable  - EKG NSR 75bpm   - CT calcium performed which revealed minimal nonobstructive epicardial coronary artery disease   - Cardiology eval noted, no further inpt work up       DVT ppx : Per primary team

## 2024-10-01 NOTE — PHYSICAL THERAPY INITIAL EVALUATION ADULT - PATIENT/FAMILY AGREES WITH PLAN
Assessment complete per SGNA guidelines. Non labored breathing. Skin dry, warm and appropriate for race. Abdomen soft.     
Assessment complete per SGNA guidelines. Non labored breathing. Skin dry, warm and appropriate for race. Abdomen soft.     
yes

## 2024-10-01 NOTE — PROGRESS NOTE ADULT - SUBJECTIVE AND OBJECTIVE BOX
HPI:  59M with PMH kidney stones, possible TIA who presents today for b/l SDH. Per patient, he began having headaches about 5-6 days ago, came on gradually and now 8/10. He was taking intermittent tylenol and ibuprofen for pain which did help for some of the pain. He reports that the headache worsened today and was associated with one episode of nausea and vomiting. Due to symptoms, he presented to [facility] for workup. He underwent CTH which showed b/l chronic on subacute on acute SDH, 1cm each with no MLS. He was transferred to [facility] for neurosurgical evaluation. He admits to taking baby aspirin daily due to possible TIA, last took this am. He denies weakness, numbness, tingling, dizziness, difficulty walking.  (25 Sep 2024 16:53)      INTERVAL HPI/OVERNIGHT EVENTS:  59y Male s/p __ seen lying comfortably in bed. Tolerating diet. Passing gas/BM. Voiding. Aleman in with __ clear urine. Denies headache, weakness, numbness, n/v/d, fevers, chills, chest pain, SOB.     Vital Signs Last 24 Hrs  T(C): 36.9 (01 Oct 2024 08:39), Max: 37.2 (30 Sep 2024 16:00)  T(F): 98.4 (01 Oct 2024 08:39), Max: 98.9 (30 Sep 2024 16:00)  HR: 79 (01 Oct 2024 10:00) (54 - 83)  BP: 119/85 (01 Oct 2024 10:00) (94/62 - 126/91)  BP(mean): 96 (01 Oct 2024 10:00) (73 - 102)  RR: 17 (01 Oct 2024 10:00) (13 - 20)  SpO2: 100% (01 Oct 2024 10:00) (95% - 100%)    Parameters below as of 01 Oct 2024 08:00  Patient On (Oxygen Delivery Method): room air        PHYSICAL EXAM:  GENERAL: NAD  HEAD: vEEG leads in place, normocephalic  MENTAL STATUS: AAO x3; Opens eyes spontaneously; Appropriately conversant without aphasia; following simple commands  CRANIAL NERVES: Visual acuity normal for age, visual fields full to confrontation, PERRL. EOMI without nystagmus. Facial sensation intact V1-3 distribution b/l. Face symmetric w/ normal eye closure and smile, tongue midline.   MOTOR: strength 5/5 b/l upper and lower extremities  SENSATION: grossly intact to light touch all extremities    LABS:                        13.0   4.69  )-----------( 179      ( 01 Oct 2024 03:50 )             38.9     10-01    138  |  102  |  18.9  ----------------------------<  118[H]  3.9   |  24.0  |  0.79    Ca    9.3      01 Oct 2024 03:50  Phos  4.1     10-01  Mg     2.2     10-01        Urinalysis Basic - ( 01 Oct 2024 03:50 )    Color: x / Appearance: x / SG: x / pH: x  Gluc: 118 mg/dL / Ketone: x  / Bili: x / Urobili: x   Blood: x / Protein: x / Nitrite: x   Leuk Esterase: x / RBC: x / WBC x   Sq Epi: x / Non Sq Epi: x / Bacteria: x        09-30 @ 07:01  -  10-01 @ 07:00  --------------------------------------------------------  IN: 1510 mL / OUT: 1050 mL / NET: 460 mL        RADIOLOGY & ADDITIONAL TESTS:    RADIOLOGY & ADDITIONAL TESTS:  CT Head No Cont 09.29.24:  IMPRESSION:  Status post bilateral middle meningeal artery embolization.    Stable subdural hemorrhages withoutevidence of interval rebleeding.    Mass effect in both cerebral convexities with approximately 2 mm midline   shift to the right is stable.    No new intracranial findings.      --- End of Report ---      CT Head No Cont 09.29.24:  IMPRESSION:  Status post bilateral middle meningeal artery embolization.    Stable subdural hemorrhages withoutevidence of interval rebleeding.    Mass effect in both cerebral convexities with approximately 2 mm midline   shift to the right is stable.    No new intracranial findings.     HPI:  59M with PMH kidney stones, possible TIA who presents today for b/l SDH. Per patient, he began having headaches about 5-6 days ago, came on gradually and now 8/10. He was taking intermittent tylenol and ibuprofen for pain which did help for some of the pain. He reports that the headache worsened today and was associated with one episode of nausea and vomiting. Due to symptoms, he presented to [facility] for workup. He underwent CTH which showed b/l chronic on subacute on acute SDH, 1cm each with no MLS. He was transferred to [facility] for neurosurgical evaluation. He admits to taking baby aspirin daily due to possible TIA, last took this am. He denies weakness, numbness, tingling, dizziness, difficulty walking.  (25 Sep 2024 16:53)      INTERVAL HPI/OVERNIGHT EVENTS:  No issues overnight; headache improved with fiorcet and offirmev    Vital Signs Last 24 Hrs  T(C): 36.9 (01 Oct 2024 08:39), Max: 37.2 (30 Sep 2024 16:00)  T(F): 98.4 (01 Oct 2024 08:39), Max: 98.9 (30 Sep 2024 16:00)  HR: 79 (01 Oct 2024 10:00) (54 - 83)  BP: 119/85 (01 Oct 2024 10:00) (94/62 - 126/91)  BP(mean): 96 (01 Oct 2024 10:00) (73 - 102)  RR: 17 (01 Oct 2024 10:00) (13 - 20)  SpO2: 100% (01 Oct 2024 10:00) (95% - 100%)    Parameters below as of 01 Oct 2024 08:00  Patient On (Oxygen Delivery Method): room air        PHYSICAL EXAM:  GENERAL: NAD  HEAD: vEEG leads in place, normocephalic  MENTAL STATUS: AAO x3; Opens eyes spontaneously; Appropriately conversant without aphasia; following simple commands  CRANIAL NERVES: Visual acuity normal for age, visual fields full to confrontation, PERRL. EOMI without nystagmus. Facial sensation intact V1-3 distribution b/l. Face symmetric w/ normal eye closure and smile, tongue midline.   MOTOR: strength 5/5 b/l upper and lower extremities  SENSATION: grossly intact to light touch all extremities    LABS:                        13.0   4.69  )-----------( 179      ( 01 Oct 2024 03:50 )             38.9     10-01    138  |  102  |  18.9  ----------------------------<  118[H]  3.9   |  24.0  |  0.79    Ca    9.3      01 Oct 2024 03:50  Phos  4.1     10-01  Mg     2.2     10-01        Urinalysis Basic - ( 01 Oct 2024 03:50 )    Color: x / Appearance: x / SG: x / pH: x  Gluc: 118 mg/dL / Ketone: x  / Bili: x / Urobili: x   Blood: x / Protein: x / Nitrite: x   Leuk Esterase: x / RBC: x / WBC x   Sq Epi: x / Non Sq Epi: x / Bacteria: x        09-30 @ 07:01  -  10-01 @ 07:00  --------------------------------------------------------  IN: 1510 mL / OUT: 1050 mL / NET: 460 mL        RADIOLOGY & ADDITIONAL TESTS:    RADIOLOGY & ADDITIONAL TESTS:  CT Head No Cont 09.29.24:  IMPRESSION:  Status post bilateral middle meningeal artery embolization.    Stable subdural hemorrhages withoutevidence of interval rebleeding.    Mass effect in both cerebral convexities with approximately 2 mm midline   shift to the right is stable.    No new intracranial findings.      --- End of Report ---      CT Head No Cont 09.29.24:  IMPRESSION:  Status post bilateral middle meningeal artery embolization.    Stable subdural hemorrhages withoutevidence of interval rebleeding.    Mass effect in both cerebral convexities with approximately 2 mm midline   shift to the right is stable.    No new intracranial findings.

## 2024-10-01 NOTE — PROGRESS NOTE ADULT - NS ATTEND AMEND GEN_ALL_CORE FT
I agree with the above. I personally examined and saw the patient. Awake, eating dinner, moving all extremities well, conversant. Plan as above.
I agree with the above. I personally examined and saw the patient. AOx3, PERRL, EOMI, moving all extremities well. PT today.
Neurosurgery Attending Attestation:    Patient seen and examined at bedside. I have made the necessary edits to the note above. Agree with plan and note as documented above.    60 y/o M with PMHx significant for kidney stones, possible TIA who presents for b/l SDH now s/p bilateral MMA embolization. Patient is awake, alert, oriented to self, hospital, and year, PERRL, EOMI, FS, TML, and moving BUE and BLE without drift. MRI pan spine negative for evidence of CSF leak. Plan for repeat CT Head today and pain management and neurology follow.    -Aleena Arvizu MD

## 2024-10-01 NOTE — PROGRESS NOTE ADULT - SUBJECTIVE AND OBJECTIVE BOX
[facility]                                        Neurology at [place]                                 Liam Davila, & Zeyad                                  [address]                                        [place], [place], [address]                                             [phone]        CC: subdural hematoma and headache     HPI:   The patient is a 59y Male who presented with headaches for about a week, was found to have b/l SDH.  He had MMA embolization on 9/26.  He has continues b/l temporal headaches with fluctuating mental status.  Family says he is more sleepy and is having memory issues at times.  Neurology is asked to evaluate. (AR).    Interim history:  Remains in [facility] [place] ICU.  Daughter at bedside reports memory much improved.   Still with some headache.    ROS:   Denies dizziness.  Denies chest pain.  Denies shortness of breath.    MEDICATIONS  (STANDING):  atorvastatin 40 milliGRAM(s) Oral at bedtime  chlorhexidine 2% Cloths 1 Application(s) Topical daily  divalproex  milliGRAM(s) Oral every 12 hours  enoxaparin Injectable 40 milliGRAM(s) SubCutaneous every 24 hours  influenza   Vaccine 0.5 milliLiter(s) IntraMuscular once  polyethylene glycol 3350 17 Gram(s) Oral daily  senna 2 Tablet(s) Oral at bedtime    Vital Signs Last 24 Hrs  T(C): 36.9 (01 Oct 2024 08:39), Max: 37.2 (30 Sep 2024 16:00)  T(F): 98.4 (01 Oct 2024 08:39), Max: 98.9 (30 Sep 2024 16:00)  HR: 79 (01 Oct 2024 10:00) (54 - 83)  BP: 119/85 (01 Oct 2024 10:00) (94/62 - 126/91)  BP(mean): 96 (01 Oct 2024 10:00) (73 - 102)  RR: 17 (01 Oct 2024 10:00) (13 - 20)  SpO2: 100% (01 Oct 2024 10:00) (95% - 100%)    Parameters below as of 01 Oct 2024 08:00  Patient On (Oxygen Delivery Method): room air    Detailed Neurologic Exam:    Mental status: The patient is awake and alert. There is no aphasia. There is no dysarthria.     Cranial nerves: Pupils equal and react symmetrically to light. There is no visual field deficit to threat. Extraocular motion is full with no nystagmus. Facial sensation is intact. Facial musculature is symmetric. Palate elevates symmetrically. Tongue is midline.    Motor: There is normal bulk and tone.  There is no tremor.  Strength grossly 5/5 bilaterally.    Sensation: Grossly intact to light touch and pin.    Reflexes: 2+ throughout and plantar responses are flexor.    Cerebellar: No dysmetria on finger nose testing.    Labs:     10-01    138  |  102  |  18.9  ----------------------------<  118[H]  3.9   |  24.0  |  0.79    Ca    9.3      01 Oct 2024 03:50  Phos  4.1     10-01  Mg     2.2     10-01                              13.0   4.69  )-----------( 179      ( 01 Oct 2024 03:50 )             38.9

## 2024-10-01 NOTE — OCCUPATIONAL THERAPY INITIAL EVALUATION ADULT - LIVES WITH, PROFILE
in a private house with 5 steps to enter with railing and flight inside with railing; pt's wife is available to assist upon discharge/spouse

## 2024-10-02 ENCOUNTER — TRANSCRIPTION ENCOUNTER (OUTPATIENT)
Age: 59
End: 2024-10-02

## 2024-10-02 VITALS
HEART RATE: 70 BPM | OXYGEN SATURATION: 96 % | TEMPERATURE: 98 F | DIASTOLIC BLOOD PRESSURE: 84 MMHG | SYSTOLIC BLOOD PRESSURE: 128 MMHG

## 2024-10-02 PROBLEM — N20.0 CALCULUS OF KIDNEY: Chronic | Status: ACTIVE | Noted: 2024-09-25

## 2024-10-02 LAB
ANION GAP SERPL CALC-SCNC: 12 MMOL/L — SIGNIFICANT CHANGE UP (ref 5–17)
BUN SERPL-MCNC: 16 MG/DL — SIGNIFICANT CHANGE UP (ref 8–20)
CALCIUM SERPL-MCNC: 9 MG/DL — SIGNIFICANT CHANGE UP (ref 8.4–10.5)
CHLORIDE SERPL-SCNC: 103 MMOL/L — SIGNIFICANT CHANGE UP (ref 96–108)
CO2 SERPL-SCNC: 25 MMOL/L — SIGNIFICANT CHANGE UP (ref 22–29)
CREAT SERPL-MCNC: 0.76 MG/DL — SIGNIFICANT CHANGE UP (ref 0.5–1.3)
EGFR: 102 ML/MIN/1.73M2 — SIGNIFICANT CHANGE UP
GLUCOSE SERPL-MCNC: 99 MG/DL — SIGNIFICANT CHANGE UP (ref 70–99)
HCT VFR BLD CALC: 37.4 % — LOW (ref 39–50)
HGB BLD-MCNC: 12.7 G/DL — LOW (ref 13–17)
MAGNESIUM SERPL-MCNC: 2.1 MG/DL — SIGNIFICANT CHANGE UP (ref 1.6–2.6)
MCHC RBC-ENTMCNC: 31.1 PG — SIGNIFICANT CHANGE UP (ref 27–34)
MCHC RBC-ENTMCNC: 34 GM/DL — SIGNIFICANT CHANGE UP (ref 32–36)
MCV RBC AUTO: 91.4 FL — SIGNIFICANT CHANGE UP (ref 80–100)
PHOSPHATE SERPL-MCNC: 3.3 MG/DL — SIGNIFICANT CHANGE UP (ref 2.4–4.7)
PLATELET # BLD AUTO: 166 K/UL — SIGNIFICANT CHANGE UP (ref 150–400)
POTASSIUM SERPL-MCNC: 3.8 MMOL/L — SIGNIFICANT CHANGE UP (ref 3.5–5.3)
POTASSIUM SERPL-SCNC: 3.8 MMOL/L — SIGNIFICANT CHANGE UP (ref 3.5–5.3)
RBC # BLD: 4.09 M/UL — LOW (ref 4.2–5.8)
RBC # FLD: 13.8 % — SIGNIFICANT CHANGE UP (ref 10.3–14.5)
SODIUM SERPL-SCNC: 140 MMOL/L — SIGNIFICANT CHANGE UP (ref 135–145)
WBC # BLD: 4.37 K/UL — SIGNIFICANT CHANGE UP (ref 3.8–10.5)
WBC # FLD AUTO: 4.37 K/UL — SIGNIFICANT CHANGE UP (ref 3.8–10.5)

## 2024-10-02 PROCEDURE — 94660 CPAP INITIATION&MGMT: CPT

## 2024-10-02 PROCEDURE — 36224 PLACE CATH CAROTD ART: CPT

## 2024-10-02 PROCEDURE — 36227 PLACE CATH XTRNL CAROTID: CPT

## 2024-10-02 PROCEDURE — 72146 MRI CHEST SPINE W/O DYE: CPT | Mod: MC

## 2024-10-02 PROCEDURE — 95711 VEEG 2-12 HR UNMONITORED: CPT

## 2024-10-02 PROCEDURE — 75898 FOLLOW-UP ANGIOGRAPHY: CPT

## 2024-10-02 PROCEDURE — 95714 VEEG EA 12-26 HR UNMNTR: CPT

## 2024-10-02 PROCEDURE — 84443 ASSAY THYROID STIM HORMONE: CPT

## 2024-10-02 PROCEDURE — 85610 PROTHROMBIN TIME: CPT

## 2024-10-02 PROCEDURE — 36226 PLACE CATH VERTEBRAL ART: CPT

## 2024-10-02 PROCEDURE — C1760: CPT

## 2024-10-02 PROCEDURE — 93306 TTE W/DOPPLER COMPLETE: CPT

## 2024-10-02 PROCEDURE — C1894: CPT

## 2024-10-02 PROCEDURE — 84100 ASSAY OF PHOSPHORUS: CPT

## 2024-10-02 PROCEDURE — 83970 ASSAY OF PARATHORMONE: CPT

## 2024-10-02 PROCEDURE — 82962 GLUCOSE BLOOD TEST: CPT

## 2024-10-02 PROCEDURE — 82310 ASSAY OF CALCIUM: CPT

## 2024-10-02 PROCEDURE — 99285 EMERGENCY DEPT VISIT HI MDM: CPT

## 2024-10-02 PROCEDURE — 72141 MRI NECK SPINE W/O DYE: CPT | Mod: MC

## 2024-10-02 PROCEDURE — 86901 BLOOD TYPING SEROLOGIC RH(D): CPT

## 2024-10-02 PROCEDURE — 87640 STAPH A DNA AMP PROBE: CPT

## 2024-10-02 PROCEDURE — 82533 TOTAL CORTISOL: CPT

## 2024-10-02 PROCEDURE — C1769: CPT

## 2024-10-02 PROCEDURE — 70450 CT HEAD/BRAIN W/O DYE: CPT | Mod: MC

## 2024-10-02 PROCEDURE — 86850 RBC ANTIBODY SCREEN: CPT

## 2024-10-02 PROCEDURE — 97167 OT EVAL HIGH COMPLEX 60 MIN: CPT

## 2024-10-02 PROCEDURE — 61624 TCAT PERM OCCLS/EMBOLJ CNS: CPT

## 2024-10-02 PROCEDURE — 75894 X-RAYS TRANSCATH THERAPY: CPT

## 2024-10-02 PROCEDURE — 85027 COMPLETE CBC AUTOMATED: CPT

## 2024-10-02 PROCEDURE — 83735 ASSAY OF MAGNESIUM: CPT

## 2024-10-02 PROCEDURE — 83036 HEMOGLOBIN GLYCOSYLATED A1C: CPT

## 2024-10-02 PROCEDURE — 83935 ASSAY OF URINE OSMOLALITY: CPT

## 2024-10-02 PROCEDURE — 82553 CREATINE MB FRACTION: CPT

## 2024-10-02 PROCEDURE — C1887: CPT

## 2024-10-02 PROCEDURE — 80061 LIPID PANEL: CPT

## 2024-10-02 PROCEDURE — 84300 ASSAY OF URINE SODIUM: CPT

## 2024-10-02 PROCEDURE — 85652 RBC SED RATE AUTOMATED: CPT

## 2024-10-02 PROCEDURE — 80048 BASIC METABOLIC PNL TOTAL CA: CPT

## 2024-10-02 PROCEDURE — 36415 COLL VENOUS BLD VENIPUNCTURE: CPT

## 2024-10-02 PROCEDURE — 72148 MRI LUMBAR SPINE W/O DYE: CPT | Mod: MC

## 2024-10-02 PROCEDURE — 80164 ASSAY DIPROPYLACETIC ACD TOT: CPT

## 2024-10-02 PROCEDURE — 93005 ELECTROCARDIOGRAM TRACING: CPT

## 2024-10-02 PROCEDURE — 86900 BLOOD TYPING SEROLOGIC ABO: CPT

## 2024-10-02 PROCEDURE — 82024 ASSAY OF ACTH: CPT

## 2024-10-02 PROCEDURE — 84484 ASSAY OF TROPONIN QUANT: CPT

## 2024-10-02 PROCEDURE — 70553 MRI BRAIN STEM W/O & W/DYE: CPT | Mod: MC

## 2024-10-02 PROCEDURE — 97163 PT EVAL HIGH COMPLEX 45 MIN: CPT

## 2024-10-02 PROCEDURE — 87641 MR-STAPH DNA AMP PROBE: CPT

## 2024-10-02 PROCEDURE — 85730 THROMBOPLASTIN TIME PARTIAL: CPT

## 2024-10-02 PROCEDURE — 82550 ASSAY OF CK (CPK): CPT

## 2024-10-02 PROCEDURE — 75574 CT ANGIO HRT W/3D IMAGE: CPT | Mod: MC

## 2024-10-02 PROCEDURE — 95700 EEG CONT REC W/VID EEG TECH: CPT

## 2024-10-02 PROCEDURE — 83930 ASSAY OF BLOOD OSMOLALITY: CPT

## 2024-10-02 PROCEDURE — C1889: CPT

## 2024-10-02 PROCEDURE — 80053 COMPREHEN METABOLIC PANEL: CPT

## 2024-10-02 RX ORDER — ASTEMIZOLE 10 MG
1 TABLET ORAL
Qty: 60 | Refills: 0
Start: 2024-10-02 | End: 2024-10-31

## 2024-10-02 RX ORDER — ASPIRIN 325 MG
1 TABLET ORAL
Refills: 0 | DISCHARGE

## 2024-10-02 RX ORDER — ACETAMINOPHEN 325 MG
1000 TABLET ORAL ONCE
Refills: 0 | Status: COMPLETED | OUTPATIENT
Start: 2024-10-02 | End: 2024-10-02

## 2024-10-02 RX ORDER — BUTALB/ACETAMINOPHEN/CAFFEINE 50-325-40
2 TABLET ORAL
Qty: 90 | Refills: 0
Start: 2024-10-02 | End: 2024-10-16

## 2024-10-02 RX ORDER — ATORVASTATIN CALCIUM 10 MG/1
1 TABLET, FILM COATED ORAL
Refills: 0 | DISCHARGE

## 2024-10-02 RX ORDER — ATORVASTATIN CALCIUM 10 MG/1
1 TABLET, FILM COATED ORAL
Qty: 30 | Refills: 0
Start: 2024-10-02 | End: 2024-10-31

## 2024-10-02 RX ADMIN — Medication 2 TABLET(S): at 06:02

## 2024-10-02 RX ADMIN — Medication 1000 MILLIGRAM(S): at 09:00

## 2024-10-02 RX ADMIN — Medication 400 MILLIGRAM(S): at 08:20

## 2024-10-02 RX ADMIN — Medication 500 MILLIGRAM(S): at 06:03

## 2024-10-02 RX ADMIN — Medication 2 TABLET(S): at 07:13

## 2024-10-02 NOTE — DISCHARGE NOTE PROVIDER - NSDCCPTREATMENT_GEN_ALL_CORE_FT
fever cough PRINCIPAL PROCEDURE  Procedure: Occlusion or embolization, vessels, head and neck region, transcatheter  Findings and Treatment: bilateral Middle Menigial Artery embolization

## 2024-10-02 NOTE — DISCHARGE NOTE NURSING/CASE MANAGEMENT/SOCIAL WORK - NSDCPEFALRISK_GEN_ALL_CORE
For information on Fall & Injury Prevention, visit: [email or web address] OR  [email or web address] OR  https://www.cdc.gov/steadi/patient.html

## 2024-10-02 NOTE — DISCHARGE NOTE PROVIDER - NSDCACTIVITY_GEN_ALL_CORE
Do not drive or operate machinery/Showering allowed/Do not make important decisions/Walking - Indoors allowed/No heavy lifting/straining/Walking - Outdoors allowed Cold/Sinus

## 2024-10-02 NOTE — DISCHARGE NOTE PROVIDER - PROVIDER TOKENS
PROVIDER:[TOKEN:[[id number]],FOLLOWUP:[1 week]],PROVIDER:[TOKEN:[[id number]],FOLLOWUP:[1 week]]

## 2024-10-02 NOTE — DISCHARGE NOTE NURSING/CASE MANAGEMENT/SOCIAL WORK - PATIENT PORTAL LINK FT
You can access the FollowMyHealth Patient Portal offered by [facility] by registering at the following website: [email or web address]. By joining [facility]’s FollowMyHealth portal, you will also be able to view your health information using other applications (apps) compatible with our system.

## 2024-10-02 NOTE — DISCHARGE NOTE NURSING/CASE MANAGEMENT/SOCIAL WORK - NSDCFUADDAPPT_GEN_ALL_CORE_FT
Please follow up with your cardiologist regarding inpatient cardiology testing   Please follow up with your primary care doctor regarding hospital course and medication changes.

## 2024-10-02 NOTE — DISCHARGE NOTE PROVIDER - CARE PROVIDERS DIRECT ADDRESSES
,DirectAddress_Unknown,[email or web address]

## 2024-10-02 NOTE — DISCHARGE NOTE PROVIDER - HOSPITAL COURSE
59M with PMH kidney stones, possible TIA who presents today for b/l SDH. Per patient, he began having headaches about 5-6 days ago, came on gradually and now 8/10. He was taking intermittent tylenol and ibuprofen for pain which did help for some of the pain. He reports that the headache worsened today and was associated with one episode of nausea and vomiting. Due to symptoms, he presented to [facility] for workup. He underwent CTH which showed b/l chronic on subacute on acute SDH, 1cm each with no MLS. He was transferred to [facility] for neurosurgical evaluation. He admits to taking baby aspirin daily due to possible TIA, last took this am. He denies weakness, numbness, tingling, dizziness, difficulty walking. Patient underwent bilateral MMA embolization on 9/26. Post op patient was monitored in ICU. Cardiology saw him on this admission for CAD, his CT calcium preformed Agatston score is 0 and TTE shows LVEF 66%, nuclear test normal. Neurology following for seizures and recommended Depakote 500 BID . PT saw patient and recommended outpatient PT with vestibular therapy and cane. Discharge interview performed with patient, wife, and daughter hospital course, diagnosis, prognosis, medications, and follow up were explained. All questions answered.

## 2024-10-02 NOTE — CHART NOTE - NSCHARTNOTESELECT_GEN_ALL_CORE
NeuroInterventional Surgery Post Procedure Note/Event Note
Nutrition Services
Cardiology/Event Note
NeuroInterventional Surgery Pre-Procedure Note/Event Note
Transfer Note

## 2024-10-02 NOTE — DISCHARGE NOTE PROVIDER - NSDCFUADDINST_GEN_ALL_CORE_FT
Diet: You should continue a Regular diet. Ensure a well balanced and proper diet to help with proper wound healing. It is imperative for adequate water intake, dietary fiber intake, and ambulation as tolerated to avoid constipation.    Pain: It is common to have a headache or incisional pain after surgery. You may take the pain medication we prescribe, or over the counter Tylenol. Pain medication, especially narcotics, can cause constipation. Use stool softeners or mild laxative as needed.     Activity: Avoid straining, heavy lifting (objects greater than 10 pounds), strenuous activity, twisting, bending, and vigorous exercise. You should not drive or operate machinery until cleared by your neurosurgeon.     Appointment(s): Follow up with Dr. Abernathy in our office outpatient in 1-2 weeks. Call [phone] to schedule an appointment.  It is also important to see your primary physician to keep them up to date regarding your recent admission and for a formal outpatient comprehensive medical review. Call their offices for an appointment as soon as you leave the hospital. If you do not have a primary physician, you may contact the [facility] [place] [phone] located on [address] [place] [address].    Should you develop any new or worsening neurologic symptoms or have concern about your incision, please call our office immediately or visit the emergency department.    Return to ER immediately for any of the following: fever, bleeding, new onset numbness/tingling/weakness, nausea and/or vomiting, chest pain, shortness of breath, confusion, seizure, altered mental status, urinary and/or fecal incontinence or retention.

## 2024-10-02 NOTE — DISCHARGE NOTE PROVIDER - NSDCMRMEDTOKEN_GEN_ALL_CORE_FT
atorvastatin 40 mg oral tablet: 1 tab(s) orally once a day (at bedtime)  butalbital/acetaminophen/caffeine 50 mg-325 mg-40 mg oral tablet: 2 tab(s) orally every 8 hours as needed for severe headache or migraine MDD: 3 tabs  divalproex sodium 500 mg oral delayed release tablet: 1 tab(s) orally every 12 hours  Physical Therapy: Vestibular Physical Therapy. 2 x per week

## 2024-10-02 NOTE — CHART NOTE - NSCHARTNOTEFT_GEN_A_CORE
Source: Patient [ x]  Family [x ]   other [ ]    Current Diet: Diet, Regular (09-26-24 @ 16:54)    PO intake:  < 50% [ ]   50-75%  [ ]   %  [x ]  other :    Source for PO intake [x ] Patient [ x] family [ ] chart [ ] staff [ ] other    Current Weight:   10/1: 92.7 kg   9/27: 94.0 kg  9/25: 91.3 kg     % Weight Change: Slight fluctuations in weight noted; will continue to monitor weights for trends.     Pertinent Medications: MEDICATIONS  (STANDING):  atorvastatin 40 milliGRAM(s) Oral at bedtime  chlorhexidine 2% Cloths 1 Application(s) Topical daily  divalproex  milliGRAM(s) Oral every 12 hours  enoxaparin Injectable 40 milliGRAM(s) SubCutaneous every 24 hours  influenza   Vaccine 0.5 milliLiter(s) IntraMuscular once  polyethylene glycol 3350 17 Gram(s) Oral daily  senna 2 Tablet(s) Oral at bedtime    MEDICATIONS  (PRN):  acetaminophen 325 mG/butalbital 50 mG/caffeine 40 mG 2 Tablet(s) Oral every 6 hours PRN severe headache or migraine  hydrALAZINE Injectable 10 milliGRAM(s) IV Push every 6 hours PRN SBP >160  labetalol Injectable 10 milliGRAM(s) IV Push every 6 hours PRN SBP >160  metoclopramide Injectable 10 milliGRAM(s) IV Push every 6 hours PRN nausea/vomiting  ondansetron Injectable 4 milliGRAM(s) IV Push every 6 hours PRN Nausea and/or Vomiting  oxyCODONE    IR 5 milliGRAM(s) Oral every 6 hours PRN Moderate Pain (4 - 6)    Pertinent Labs: CBC Full  -  ( 02 Oct 2024 06:00 )  WBC Count : 4.37 K/uL  RBC Count : 4.09 M/uL  Hemoglobin : 12.7 g/dL  Hematocrit : 37.4 %  Platelet Count - Automated : 166 K/uL  Mean Cell Volume : 91.4 fl  Mean Cell Hemoglobin : 31.1 pg  Mean Cell Hemoglobin Concentration : 34.0 gm/dL    Skin: No breakdown noted     Nutrition focused physical exam conducted - found signs of malnutrition [x ]absent [ ]present    Subcutaneous fat loss: [ ] Orbital fat pads region, [ ]Buccal fat region, [ ]Triceps region,  [ ]Ribs region    Muscle wasting: [ ]Temples region, [ ]Clavicle region, [ ]Shoulder region, [ ]Scapula region, [ ]Interosseous region,  [ ]thigh region, [ ]Calf region    Estimated Needs:   [x ] no change since previous assessment  [ ] recalculated:     Hospital Course:   Pt is a 59 year old M with h/o possible prior TIAs (x2) ~ 4 years ago, maintained on ASA, h/o nephrolithiasis transferred from [place] after presenting with a headache x 5 days  and found to have b/l SDH, s/p MMA embolization (9/26) . Serial head imaging stable. TTE with areas concerning for RWAs. Has been forgetful with increased somnolence (but easily accusable and appropriately responding). Started on vEEG.       Current Nutrition Diagnosis:  Pt remains at high nutrition risk secondary to increased nutrient needs related to increased physiologic demand for nutrient as evidenced by b/l SDH, s/p MMA embolization. Spoke to pt and family at bedside; who report pt taking and tolerating meals well; denies difficulty chewing or swallowing. Family also bringing in food from home which pt is eating. Aware pt pending DC. Recommendations below:     Recommendations:   1. MVI daily   2. Check weight daily to monitor trends   3. Continue with Diet Rx     Monitoring and Evaluation:   [x ] PO intake [x ] Tolerance to diet prescription [X] Weights  [X] Follow up per protocol [X] Labs:

## 2024-10-03 PROBLEM — S06.5XAA SUBDURAL HEMATOMA: Status: ACTIVE | Noted: 2024-10-03

## 2024-10-08 ENCOUNTER — OUTPATIENT (OUTPATIENT)
Dept: OUTPATIENT SERVICES | Facility: HOSPITAL | Age: 59
LOS: 1 days | End: 2024-10-08
Payer: COMMERCIAL

## 2024-10-08 ENCOUNTER — APPOINTMENT (OUTPATIENT)
Dept: CT IMAGING | Facility: IMAGING CENTER | Age: 59
End: 2024-10-08
Payer: COMMERCIAL

## 2024-10-08 DIAGNOSIS — S06.5XAA TRAUMATIC SUBDURAL HEMORRHAGE WITH LOSS OF CONSCIOUSNESS STATUS UNKNOWN, INITIAL ENCOUNTER: ICD-10-CM

## 2024-10-08 PROCEDURE — 70450 CT HEAD/BRAIN W/O DYE: CPT | Mod: 26

## 2024-10-08 PROCEDURE — 70450 CT HEAD/BRAIN W/O DYE: CPT

## 2024-10-10 ENCOUNTER — NON-APPOINTMENT (OUTPATIENT)
Age: 59
End: 2024-10-10

## 2024-10-10 ENCOUNTER — APPOINTMENT (OUTPATIENT)
Dept: NEUROSURGERY | Facility: CLINIC | Age: 59
End: 2024-10-10
Payer: COMMERCIAL

## 2024-10-10 ENCOUNTER — APPOINTMENT (OUTPATIENT)
Dept: NEUROLOGY | Facility: CLINIC | Age: 59
End: 2024-10-10
Payer: COMMERCIAL

## 2024-10-10 VITALS
SYSTOLIC BLOOD PRESSURE: 110 MMHG | HEIGHT: 69 IN | BODY MASS INDEX: 28.73 KG/M2 | WEIGHT: 194 LBS | HEART RATE: 65 BPM | DIASTOLIC BLOOD PRESSURE: 68 MMHG | OXYGEN SATURATION: 97 % | TEMPERATURE: 98.1 F

## 2024-10-10 VITALS
HEIGHT: 69 IN | WEIGHT: 194 LBS | DIASTOLIC BLOOD PRESSURE: 68 MMHG | HEART RATE: 71 BPM | SYSTOLIC BLOOD PRESSURE: 103 MMHG | BODY MASS INDEX: 28.73 KG/M2 | OXYGEN SATURATION: 98 %

## 2024-10-10 DIAGNOSIS — G44.211 EPISODIC TENSION-TYPE HEADACHE, INTRACTABLE: ICD-10-CM

## 2024-10-10 DIAGNOSIS — G44.201 TENSION-TYPE HEADACHE, UNSPECIFIED, INTRACTABLE: ICD-10-CM

## 2024-10-10 DIAGNOSIS — Z78.9 OTHER SPECIFIED HEALTH STATUS: ICD-10-CM

## 2024-10-10 DIAGNOSIS — S06.5XAA TRAUMATIC SUBDURAL HEMORRHAGE WITH LOSS OF CONSCIOUSNESS STATUS UNKNOWN, INITIAL ENCOUNTER: ICD-10-CM

## 2024-10-10 PROCEDURE — 99214 OFFICE O/P EST MOD 30 MIN: CPT

## 2024-10-10 PROCEDURE — G2211 COMPLEX E/M VISIT ADD ON: CPT | Mod: NC

## 2024-10-10 PROCEDURE — 99215 OFFICE O/P EST HI 40 MIN: CPT

## 2024-10-10 RX ORDER — GABAPENTIN 100 MG/1
100 CAPSULE ORAL 3 TIMES DAILY
Qty: 540 | Refills: 3 | Status: ACTIVE | COMMUNITY
Start: 2024-10-10 | End: 1900-01-01

## 2024-10-10 RX ORDER — DIVALPROEX SODIUM 500 MG/1
TABLET, DELAYED RELEASE ORAL
Refills: 0 | Status: ACTIVE | COMMUNITY

## 2024-10-10 RX ORDER — ATORVASTATIN CALCIUM 80 MG/1
TABLET, FILM COATED ORAL
Refills: 0 | Status: ACTIVE | COMMUNITY

## 2024-10-10 RX ORDER — GUAIFENESIN 1200 MG/1
TABLET, EXTENDED RELEASE ORAL
Refills: 0 | Status: ACTIVE | COMMUNITY

## 2024-10-14 ENCOUNTER — APPOINTMENT (OUTPATIENT)
Dept: NEUROLOGY | Facility: CLINIC | Age: 59
End: 2024-10-14

## 2024-10-22 ENCOUNTER — APPOINTMENT (OUTPATIENT)
Dept: CT IMAGING | Facility: IMAGING CENTER | Age: 59
End: 2024-10-22
Payer: COMMERCIAL

## 2024-10-22 ENCOUNTER — OUTPATIENT (OUTPATIENT)
Dept: OUTPATIENT SERVICES | Facility: HOSPITAL | Age: 59
LOS: 1 days | End: 2024-10-22
Payer: COMMERCIAL

## 2024-10-22 DIAGNOSIS — S06.5XAA TRAUMATIC SUBDURAL HEMORRHAGE WITH LOSS OF CONSCIOUSNESS STATUS UNKNOWN, INITIAL ENCOUNTER: ICD-10-CM

## 2024-10-22 PROCEDURE — 70450 CT HEAD/BRAIN W/O DYE: CPT | Mod: 26

## 2024-10-22 PROCEDURE — 70450 CT HEAD/BRAIN W/O DYE: CPT

## 2024-10-23 DIAGNOSIS — H54.7 UNSPECIFIED VISUAL LOSS: ICD-10-CM

## 2024-10-28 ENCOUNTER — APPOINTMENT (OUTPATIENT)
Dept: NEUROSURGERY | Facility: CLINIC | Age: 59
End: 2024-10-28
Payer: COMMERCIAL

## 2024-10-28 VITALS
HEART RATE: 67 BPM | HEIGHT: 69 IN | OXYGEN SATURATION: 98 % | DIASTOLIC BLOOD PRESSURE: 81 MMHG | TEMPERATURE: 98.3 F | BODY MASS INDEX: 28.73 KG/M2 | SYSTOLIC BLOOD PRESSURE: 125 MMHG | WEIGHT: 194 LBS

## 2024-10-28 DIAGNOSIS — S06.5XAA TRAUMATIC SUBDURAL HEMORRHAGE WITH LOSS OF CONSCIOUSNESS STATUS UNKNOWN, INITIAL ENCOUNTER: ICD-10-CM

## 2024-10-28 DIAGNOSIS — G44.211 EPISODIC TENSION-TYPE HEADACHE, INTRACTABLE: ICD-10-CM

## 2024-10-28 PROCEDURE — 99214 OFFICE O/P EST MOD 30 MIN: CPT

## 2024-11-14 ENCOUNTER — OUTPATIENT (OUTPATIENT)
Dept: OUTPATIENT SERVICES | Facility: HOSPITAL | Age: 59
LOS: 1 days | End: 2024-11-14
Payer: COMMERCIAL

## 2024-11-14 ENCOUNTER — NON-APPOINTMENT (OUTPATIENT)
Age: 59
End: 2024-11-14

## 2024-11-14 ENCOUNTER — APPOINTMENT (OUTPATIENT)
Dept: CT IMAGING | Facility: IMAGING CENTER | Age: 59
End: 2024-11-14
Payer: COMMERCIAL

## 2024-11-14 DIAGNOSIS — S06.5XAA TRAUMATIC SUBDURAL HEMORRHAGE WITH LOSS OF CONSCIOUSNESS STATUS UNKNOWN, INITIAL ENCOUNTER: ICD-10-CM

## 2024-11-14 PROCEDURE — 70450 CT HEAD/BRAIN W/O DYE: CPT | Mod: 26

## 2024-11-14 PROCEDURE — 70450 CT HEAD/BRAIN W/O DYE: CPT

## 2024-11-18 ENCOUNTER — NON-APPOINTMENT (OUTPATIENT)
Age: 59
End: 2024-11-18

## 2024-11-18 ENCOUNTER — APPOINTMENT (OUTPATIENT)
Dept: OPHTHALMOLOGY | Facility: CLINIC | Age: 59
End: 2024-11-18
Payer: COMMERCIAL

## 2024-11-18 PROCEDURE — 92083 EXTENDED VISUAL FIELD XM: CPT

## 2024-11-18 PROCEDURE — 99204 OFFICE O/P NEW MOD 45 MIN: CPT

## 2024-11-21 ENCOUNTER — APPOINTMENT (OUTPATIENT)
Dept: NEUROSURGERY | Facility: CLINIC | Age: 59
End: 2024-11-21
Payer: COMMERCIAL

## 2024-11-21 VITALS
OXYGEN SATURATION: 97 % | WEIGHT: 195 LBS | SYSTOLIC BLOOD PRESSURE: 120 MMHG | BODY MASS INDEX: 28.88 KG/M2 | DIASTOLIC BLOOD PRESSURE: 81 MMHG | HEIGHT: 69 IN | HEART RATE: 60 BPM | TEMPERATURE: 97.9 F

## 2024-11-21 DIAGNOSIS — G96.191 PERINEURAL CYST: ICD-10-CM

## 2024-11-21 DIAGNOSIS — S06.5XAA TRAUMATIC SUBDURAL HEMORRHAGE WITH LOSS OF CONSCIOUSNESS STATUS UNKNOWN, INITIAL ENCOUNTER: ICD-10-CM

## 2024-11-21 PROCEDURE — 99214 OFFICE O/P EST MOD 30 MIN: CPT

## 2024-12-03 ENCOUNTER — APPOINTMENT (OUTPATIENT)
Dept: NEUROLOGY | Facility: CLINIC | Age: 59
End: 2024-12-03

## 2024-12-10 ENCOUNTER — APPOINTMENT (OUTPATIENT)
Dept: NEUROLOGY | Facility: CLINIC | Age: 59
End: 2024-12-10
Payer: COMMERCIAL

## 2024-12-10 DIAGNOSIS — S06.5XAA TRAUMATIC SUBDURAL HEMORRHAGE WITH LOSS OF CONSCIOUSNESS STATUS UNKNOWN, INITIAL ENCOUNTER: ICD-10-CM

## 2024-12-10 DIAGNOSIS — H93.19 TINNITUS, UNSPECIFIED EAR: ICD-10-CM

## 2024-12-10 PROCEDURE — 99214 OFFICE O/P EST MOD 30 MIN: CPT | Mod: 95

## 2024-12-10 PROCEDURE — G2211 COMPLEX E/M VISIT ADD ON: CPT | Mod: NC

## 2024-12-23 ENCOUNTER — OUTPATIENT (OUTPATIENT)
Dept: OUTPATIENT SERVICES | Facility: HOSPITAL | Age: 59
LOS: 1 days | End: 2024-12-23
Payer: COMMERCIAL

## 2024-12-23 ENCOUNTER — APPOINTMENT (OUTPATIENT)
Dept: MRI IMAGING | Facility: IMAGING CENTER | Age: 59
End: 2024-12-23
Payer: COMMERCIAL

## 2024-12-23 DIAGNOSIS — H93.19 TINNITUS, UNSPECIFIED EAR: ICD-10-CM

## 2024-12-23 PROCEDURE — A9585: CPT

## 2024-12-23 PROCEDURE — 70553 MRI BRAIN STEM W/O & W/DYE: CPT | Mod: 26

## 2024-12-23 PROCEDURE — 70553 MRI BRAIN STEM W/O & W/DYE: CPT

## 2024-12-25 PROBLEM — F10.90 ALCOHOL USE: Status: ACTIVE | Noted: 2024-10-10

## 2025-01-06 ENCOUNTER — APPOINTMENT (OUTPATIENT)
Dept: MRI IMAGING | Facility: IMAGING CENTER | Age: 60
End: 2025-01-06

## 2025-01-08 DIAGNOSIS — Z13.83 ENCOUNTER FOR SCREENING FOR RESPIRATORY DISORDER NEC: ICD-10-CM

## 2025-01-09 ENCOUNTER — NON-APPOINTMENT (OUTPATIENT)
Age: 60
End: 2025-01-09

## 2025-01-09 ENCOUNTER — OUTPATIENT (OUTPATIENT)
Dept: OUTPATIENT SERVICES | Facility: HOSPITAL | Age: 60
LOS: 1 days | End: 2025-01-09
Payer: COMMERCIAL

## 2025-01-09 ENCOUNTER — APPOINTMENT (OUTPATIENT)
Dept: CT IMAGING | Facility: IMAGING CENTER | Age: 60
End: 2025-01-09

## 2025-01-09 ENCOUNTER — APPOINTMENT (OUTPATIENT)
Dept: MRI IMAGING | Facility: IMAGING CENTER | Age: 60
End: 2025-01-09
Payer: COMMERCIAL

## 2025-01-09 ENCOUNTER — APPOINTMENT (OUTPATIENT)
Dept: MRI IMAGING | Facility: IMAGING CENTER | Age: 60
End: 2025-01-09

## 2025-01-09 DIAGNOSIS — S06.5XAA TRAUMATIC SUBDURAL HEMORRHAGE WITH LOSS OF CONSCIOUSNESS STATUS UNKNOWN, INITIAL ENCOUNTER: ICD-10-CM

## 2025-01-09 PROCEDURE — 72141 MRI NECK SPINE W/O DYE: CPT | Mod: 26

## 2025-01-09 PROCEDURE — 72141 MRI NECK SPINE W/O DYE: CPT

## 2025-01-09 PROCEDURE — 70450 CT HEAD/BRAIN W/O DYE: CPT

## 2025-01-09 PROCEDURE — 72146 MRI CHEST SPINE W/O DYE: CPT | Mod: 26

## 2025-01-09 PROCEDURE — 70450 CT HEAD/BRAIN W/O DYE: CPT | Mod: 26

## 2025-01-09 PROCEDURE — 72146 MRI CHEST SPINE W/O DYE: CPT

## 2025-01-13 ENCOUNTER — APPOINTMENT (OUTPATIENT)
Dept: PULMONOLOGY | Facility: CLINIC | Age: 60
End: 2025-01-13
Payer: COMMERCIAL

## 2025-01-13 VITALS
OXYGEN SATURATION: 97 % | DIASTOLIC BLOOD PRESSURE: 82 MMHG | HEIGHT: 68.6 IN | SYSTOLIC BLOOD PRESSURE: 125 MMHG | BODY MASS INDEX: 30.71 KG/M2 | HEART RATE: 71 BPM | WEIGHT: 205 LBS

## 2025-01-13 DIAGNOSIS — Z80.1 FAMILY HISTORY OF MALIGNANT NEOPLASM OF TRACHEA, BRONCHUS AND LUNG: ICD-10-CM

## 2025-01-13 DIAGNOSIS — Z77.22 CONTACT WITH AND (SUSPECTED) EXPOSURE TO ENVIRONMENTAL TOBACCO SMOKE (ACUTE) (CHRONIC): ICD-10-CM

## 2025-01-13 DIAGNOSIS — Z78.9 OTHER SPECIFIED HEALTH STATUS: ICD-10-CM

## 2025-01-13 DIAGNOSIS — R91.1 SOLITARY PULMONARY NODULE: ICD-10-CM

## 2025-01-13 PROCEDURE — 94010 BREATHING CAPACITY TEST: CPT

## 2025-01-13 PROCEDURE — 94729 DIFFUSING CAPACITY: CPT

## 2025-01-13 PROCEDURE — 94726 PLETHYSMOGRAPHY LUNG VOLUMES: CPT

## 2025-01-13 PROCEDURE — 99203 OFFICE O/P NEW LOW 30 MIN: CPT | Mod: 25

## 2025-01-13 PROCEDURE — ZZZZZ: CPT

## 2025-01-16 ENCOUNTER — APPOINTMENT (OUTPATIENT)
Dept: NEUROSURGERY | Facility: CLINIC | Age: 60
End: 2025-01-16
Payer: COMMERCIAL

## 2025-01-16 ENCOUNTER — NON-APPOINTMENT (OUTPATIENT)
Age: 60
End: 2025-01-16

## 2025-01-16 VITALS
TEMPERATURE: 98.5 F | OXYGEN SATURATION: 96 % | HEART RATE: 74 BPM | HEIGHT: 68 IN | BODY MASS INDEX: 29.7 KG/M2 | WEIGHT: 196 LBS | DIASTOLIC BLOOD PRESSURE: 82 MMHG | SYSTOLIC BLOOD PRESSURE: 136 MMHG

## 2025-01-16 DIAGNOSIS — S06.5XAA TRAUMATIC SUBDURAL HEMORRHAGE WITH LOSS OF CONSCIOUSNESS STATUS UNKNOWN, INITIAL ENCOUNTER: ICD-10-CM

## 2025-01-16 PROCEDURE — 99214 OFFICE O/P EST MOD 30 MIN: CPT

## 2025-01-28 ENCOUNTER — APPOINTMENT (OUTPATIENT)
Dept: NEUROLOGY | Facility: CLINIC | Age: 60
End: 2025-01-28
Payer: COMMERCIAL

## 2025-01-28 DIAGNOSIS — S06.5XAA TRAUMATIC SUBDURAL HEMORRHAGE WITH LOSS OF CONSCIOUSNESS STATUS UNKNOWN, INITIAL ENCOUNTER: ICD-10-CM

## 2025-01-28 DIAGNOSIS — H93.19 TINNITUS, UNSPECIFIED EAR: ICD-10-CM

## 2025-01-28 PROCEDURE — G2211 COMPLEX E/M VISIT ADD ON: CPT | Mod: NC

## 2025-01-28 PROCEDURE — 99214 OFFICE O/P EST MOD 30 MIN: CPT | Mod: 95

## 2025-01-31 NOTE — DISCHARGE NOTE PROVIDER - CARE PROVIDER_API CALL
Ana M from [facility] [phone] has called back but now that she has been seeing him in the day his Osat have been dropping from 9A it would be in 88%, 86, 85, then around 12P she noticed he was around 77%RA. He tested his with his own Osat because it was around 95% RA. He does state that he having worse shortness of breath, but doesn't sound it on the phone. She states she knows he needs the oxygen at the home but in the mean time she informed him that if breathing gets worse, or having chest pain. He will continue to monitor with his own pulse ox. He states if it goes under 90% he will go to ED. He has no other symptoms, no congestion, no fever, so no URI. She states right now that she got another reading that he was at 86%. She states it was noted that he had movement, but he he's been noted having lower Osat.      She just wanted provider to be aware. She also wanted to see if we have received the Overnight oximetry test   Benton Hsieh  Interventional Neuroradiology  [address] [place] [address]  Phone: [phone]  Fax: [phone]  Follow Up Time: 1 week    Louie Abernathy  Neurosurgery  [address] [place] [id number]  Phone: [phone]  Fax: [phone]  Follow Up Time: 1 week

## 2025-04-24 ENCOUNTER — APPOINTMENT (OUTPATIENT)
Dept: PULMONOLOGY | Facility: CLINIC | Age: 60
End: 2025-04-24

## 2025-06-25 ENCOUNTER — APPOINTMENT (OUTPATIENT)
Dept: CT IMAGING | Facility: IMAGING CENTER | Age: 60
End: 2025-06-25
Payer: COMMERCIAL

## 2025-06-25 ENCOUNTER — OUTPATIENT (OUTPATIENT)
Dept: OUTPATIENT SERVICES | Facility: HOSPITAL | Age: 60
LOS: 1 days | End: 2025-06-25
Payer: COMMERCIAL

## 2025-06-25 DIAGNOSIS — S06.5XAA TRAUMATIC SUBDURAL HEMORRHAGE WITH LOSS OF CONSCIOUSNESS STATUS UNKNOWN, INITIAL ENCOUNTER: ICD-10-CM

## 2025-06-25 PROCEDURE — 70450 CT HEAD/BRAIN W/O DYE: CPT | Mod: 26

## 2025-06-25 PROCEDURE — 70450 CT HEAD/BRAIN W/O DYE: CPT

## 2025-06-26 NOTE — ED PROVIDER NOTE - NEURO NEGATIVE STATEMENT, MLM
--DO NOT REPLY - Sent from PACT - If sent to wrong pool, reroute to P [facility] Reroute pool --    Message Type:  Refill Medication   Is the medication pended:Yes  Medication name: Warfarin 3 MG taking 1 tablet and a half currently.  Message: Patient was trying to place a refill request through [facility] but was not able to. He states the current prescription should have 3 refills on it and this is his first time refilling it.    Preferred pharmacy verified, and selected.     Call Back #: [phone]   Can a detailed message be left?  Yes - Voicemail   Is the patient OUT of Medication?  Yes: Working Hours: route as HIGH priority according to KB. Patient has been advised the message will be reviewed within 1 business day   no loss of consciousness, no gait abnormality, no headache, no sensory deficits, and no weakness.

## 2025-06-27 ENCOUNTER — NON-APPOINTMENT (OUTPATIENT)
Age: 60
End: 2025-06-27

## 2025-07-03 ENCOUNTER — OUTPATIENT (OUTPATIENT)
Dept: OUTPATIENT SERVICES | Facility: HOSPITAL | Age: 60
LOS: 1 days | End: 2025-07-03
Payer: COMMERCIAL

## 2025-07-03 ENCOUNTER — APPOINTMENT (OUTPATIENT)
Dept: MRI IMAGING | Facility: IMAGING CENTER | Age: 60
End: 2025-07-03

## 2025-07-03 PROCEDURE — 72146 MRI CHEST SPINE W/O DYE: CPT

## 2025-07-03 PROCEDURE — 72146 MRI CHEST SPINE W/O DYE: CPT | Mod: 26

## 2025-07-10 ENCOUNTER — APPOINTMENT (OUTPATIENT)
Dept: NEUROSURGERY | Facility: CLINIC | Age: 60
End: 2025-07-10

## 2025-07-10 VITALS
HEART RATE: 64 BPM | TEMPERATURE: 98 F | WEIGHT: 200 LBS | HEIGHT: 68 IN | BODY MASS INDEX: 30.31 KG/M2 | OXYGEN SATURATION: 96 % | DIASTOLIC BLOOD PRESSURE: 84 MMHG | SYSTOLIC BLOOD PRESSURE: 131 MMHG

## 2025-07-10 PROCEDURE — 99213 OFFICE O/P EST LOW 20 MIN: CPT
